# Patient Record
Sex: FEMALE | Race: WHITE
[De-identification: names, ages, dates, MRNs, and addresses within clinical notes are randomized per-mention and may not be internally consistent; named-entity substitution may affect disease eponyms.]

---

## 2022-06-01 ENCOUNTER — HOSPITAL ENCOUNTER (EMERGENCY)
Dept: HOSPITAL 79 - ER1 | Age: 57
LOS: 1 days | Discharge: LEFT BEFORE DISCHARGE | End: 2022-06-02
Payer: COMMERCIAL

## 2022-06-01 DIAGNOSIS — M79.89: Primary | ICD-10-CM

## 2022-06-01 DIAGNOSIS — E66.9: ICD-10-CM

## 2022-06-01 DIAGNOSIS — N18.9: ICD-10-CM

## 2022-06-01 LAB
BUN/CREATININE RATIO: 16 (ref 0–10)
HGB BLD-MCNC: 9.2 GM/DL (ref 12.3–15.3)
RED BLOOD COUNT: 3.49 M/UL (ref 4–5.1)
WHITE BLOOD COUNT: 7.5 K/UL (ref 4.5–11)

## 2022-06-03 ENCOUNTER — HOSPITAL ENCOUNTER (EMERGENCY)
Dept: HOSPITAL 79 - ER1 | Age: 57
Discharge: ANOTHER HOSPITAL | End: 2022-06-03
Payer: COMMERCIAL

## 2022-06-03 DIAGNOSIS — I13.0: ICD-10-CM

## 2022-06-03 DIAGNOSIS — I16.1: ICD-10-CM

## 2022-06-03 DIAGNOSIS — N18.9: ICD-10-CM

## 2022-06-03 DIAGNOSIS — Z20.822: ICD-10-CM

## 2022-06-03 DIAGNOSIS — I61.4: Primary | ICD-10-CM

## 2022-06-03 DIAGNOSIS — I50.9: ICD-10-CM

## 2022-06-03 DIAGNOSIS — D64.9: ICD-10-CM

## 2022-06-03 LAB
BUN/CREATININE RATIO: 16 (ref 0–10)
HGB BLD-MCNC: 9.5 GM/DL (ref 12.3–15.3)
RED BLOOD COUNT: 3.61 M/UL (ref 4–5.1)
WHITE BLOOD COUNT: 7.9 K/UL (ref 4.5–11)

## 2022-12-05 ENCOUNTER — OFFICE VISIT (OUTPATIENT)
Dept: FAMILY MEDICINE CLINIC | Facility: CLINIC | Age: 57
End: 2022-12-05

## 2022-12-05 VITALS
BODY MASS INDEX: 34.73 KG/M2 | HEART RATE: 84 BPM | TEMPERATURE: 98.2 F | SYSTOLIC BLOOD PRESSURE: 121 MMHG | RESPIRATION RATE: 18 BRPM | HEIGHT: 63 IN | OXYGEN SATURATION: 95 % | DIASTOLIC BLOOD PRESSURE: 81 MMHG | WEIGHT: 196 LBS

## 2022-12-05 DIAGNOSIS — I10 ESSENTIAL HYPERTENSION: ICD-10-CM
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
## 2022-12-05 DIAGNOSIS — N18.6 ESRD (END STAGE RENAL DISEASE): ICD-10-CM

## 2022-12-05 DIAGNOSIS — S12.101A CLOSED NONDISPLACED FRACTURE OF SECOND CERVICAL VERTEBRA, UNSPECIFIED FRACTURE MORPHOLOGY, INITIAL ENCOUNTER: Primary | ICD-10-CM

## 2022-12-05 DIAGNOSIS — E66.01 MORBID OBESITY WITH BODY MASS INDEX (BMI) OF 40.0 OR HIGHER: ICD-10-CM

## 2022-12-05 PROBLEM — N18.9 CKD (CHRONIC KIDNEY DISEASE): Status: ACTIVE | Noted: 2022-06-04
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
## 2022-12-05 PROBLEM — E87.70 HYPERVOLEMIA ASSOCIATED WITH RENAL INSUFFICIENCY: Status: ACTIVE | Noted: 2022-10-24

## 2022-12-05 PROBLEM — F32.A ANXIETY AND DEPRESSION: Status: ACTIVE | Noted: 2022-06-21

## 2022-12-05 PROBLEM — R68.89 TOTAL SELF-CARE DEFICIT: Status: ACTIVE | Noted: 2022-12-05

## 2022-12-05 PROBLEM — E83.41 HYPERMAGNESEMIA: Status: ACTIVE | Noted: 2022-06-14

## 2022-12-05 PROBLEM — F41.9 ANXIETY AND DEPRESSION: Status: ACTIVE | Noted: 2022-06-21

## 2022-12-05 PROBLEM — R53.81 PHYSICAL DEBILITY: Status: ACTIVE | Noted: 2022-10-24

## 2022-12-05 PROBLEM — D64.9 NORMOCYTIC ANEMIA: Status: ACTIVE | Noted: 2022-06-04

## 2022-12-05 PROBLEM — E83.51 HYPOCALCEMIA: Status: ACTIVE | Noted: 2022-10-18

## 2022-12-05 PROBLEM — R18.8 ASCITES: Status: ACTIVE | Noted: 2022-10-18

## 2022-12-05 PROBLEM — Z74.09 IMPAIRED MOBILITY: Status: ACTIVE | Noted: 2022-06-07

## 2022-12-05 PROBLEM — Z91.89 AT RISK FOR VENOUS THROMBOEMBOLISM (VTE): Status: ACTIVE | Noted: 2022-07-06

## 2022-12-05 PROBLEM — R47.01 APHASIA: Status: ACTIVE | Noted: 2022-12-05

## 2022-12-05 PROBLEM — K86.89 PANCREATIC DUCT DILATED: Status: ACTIVE | Noted: 2022-10-18

## 2022-12-05 PROBLEM — E83.39 HYPERPHOSPHATEMIA: Status: ACTIVE | Noted: 2022-06-09

## 2022-12-05 PROBLEM — E87.5 HYPERKALEMIA: Status: ACTIVE | Noted: 2022-06-09

## 2022-12-05 PROBLEM — I63.9 ACUTE ISCHEMIC STROKE: Status: ACTIVE | Noted: 2022-06-08

## 2022-12-05 PROBLEM — R41.89 COGNITIVE IMPAIRMENT: Status: ACTIVE | Noted: 2022-07-02

## 2022-12-05 PROBLEM — J90 PLEURAL EFFUSION, LEFT: Status: ACTIVE | Noted: 2022-10-18

## 2022-12-05 PROBLEM — G47.33 OSA (OBSTRUCTIVE SLEEP APNEA): Status: ACTIVE | Noted: 2022-06-06

## 2022-12-05 PROBLEM — N28.9 HYPERVOLEMIA ASSOCIATED WITH RENAL INSUFFICIENCY: Status: ACTIVE | Noted: 2022-10-24

## 2022-12-05 PROCEDURE — 99495 TRANSJ CARE MGMT MOD F2F 14D: CPT | Performed by: FAMILY MEDICINE

## 2022-12-05 RX ORDER — NIFEDIPINE 90 MG/1
90 TABLET, FILM COATED, EXTENDED RELEASE ORAL DAILY
COMMUNITY
Start: 2022-10-01

## 2022-12-05 RX ORDER — SEVELAMER CARBONATE 800 MG/1
800 TABLET, FILM COATED ORAL
COMMUNITY
Start: 2022-11-19

## 2022-12-05 RX ORDER — SENNA PLUS 8.6 MG/1
8.6 TABLET ORAL
COMMUNITY
Start: 2022-12-01

## 2022-12-05 RX ORDER — ATORVASTATIN CALCIUM 40 MG/1
40 TABLET, FILM COATED ORAL
COMMUNITY
Start: 2022-07-06

## 2022-12-05 RX ORDER — LANOLIN ALCOHOL/MO/W.PET/CERES
3 CREAM (GRAM) TOPICAL
COMMUNITY
Start: 2022-11-19

## 2022-12-05 RX ORDER — ERGOCALCIFEROL 1.25 MG/1
50000 CAPSULE ORAL
COMMUNITY
Start: 2022-12-01

## 2022-12-05 RX ORDER — CARVEDILOL 25 MG/1
25 TABLET ORAL
COMMUNITY
Start: 2022-11-19

## 2022-12-05 RX ORDER — BISACODYL 5 MG/1
5 TABLET, DELAYED RELEASE ORAL
COMMUNITY
Start: 2022-12-02 | End: 2023-01-01

## 2022-12-05 RX ORDER — BUMETANIDE 2 MG/1
4 TABLET ORAL
COMMUNITY
Start: 2022-11-19

## 2022-12-05 RX ORDER — ANORECTAL OINTMENT 15.7; .44; 24; 20.6 G/100G; G/100G; G/100G; G/100G
OINTMENT TOPICAL
COMMUNITY
Start: 2022-12-01

## 2022-12-05 RX ORDER — FAMOTIDINE 10 MG
10 TABLET ORAL
COMMUNITY
Start: 2022-11-19

## 2022-12-05 RX ORDER — LANOLIN ALCOHOL/MO/W.PET/CERES
325 CREAM (GRAM) TOPICAL
COMMUNITY
Start: 2022-10-05
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
## 2022-12-16 ENCOUNTER — OFFICE VISIT (OUTPATIENT)
Dept: FAMILY MEDICINE CLINIC | Facility: CLINIC | Age: 57
End: 2022-12-16

## 2022-12-16 VITALS
OXYGEN SATURATION: 94 % | RESPIRATION RATE: 18 BRPM | TEMPERATURE: 98.2 F | DIASTOLIC BLOOD PRESSURE: 60 MMHG | HEART RATE: 66 BPM | SYSTOLIC BLOOD PRESSURE: 132 MMHG | WEIGHT: 196 LBS | BODY MASS INDEX: 34.73 KG/M2 | HEIGHT: 63 IN

## 2022-12-16 DIAGNOSIS — R52 BODY ACHES: Primary | ICD-10-CM

## 2022-12-16 DIAGNOSIS — R05.1 ACUTE COUGH: ICD-10-CM

## 2022-12-16 DIAGNOSIS — J20.8 ACUTE BRONCHITIS DUE TO OTHER SPECIFIED ORGANISMS: ICD-10-CM

## 2022-12-16 LAB
EXPIRATION DATE: NORMAL
FLUAV AG UPPER RESP QL IA.RAPID: NOT DETECTED
FLUBV AG UPPER RESP QL IA.RAPID: NOT DETECTED
INTERNAL CONTROL: NORMAL
Lab: NORMAL
SARS-COV-2 AG UPPER RESP QL IA.RAPID: NOT DETECTED

## 2022-12-16 PROCEDURE — 96372 THER/PROPH/DIAG INJ SC/IM: CPT | Performed by: PSYCHOLOGIST

## 2022-12-16 PROCEDURE — 87428 SARSCOV & INF VIR A&B AG IA: CPT | Performed by: PSYCHOLOGIST

## 2022-12-16 PROCEDURE — 99213 OFFICE O/P EST LOW 20 MIN: CPT | Performed by: PSYCHOLOGIST

## 2022-12-16 RX ORDER — AZITHROMYCIN 250 MG/1
TABLET, FILM COATED ORAL
Qty: 6 TABLET | Refills: 1 | Status: SHIPPED | OUTPATIENT
Start: 2022-12-16 | End: 2022-12-29 | Stop reason: SDUPTHER

## 2022-12-16 RX ORDER — BENZONATATE 100 MG/1
100 CAPSULE ORAL 3 TIMES DAILY PRN
Qty: 30 CAPSULE | Refills: 0 | Status: SHIPPED | OUTPATIENT
Start: 2022-12-16 | End: 2022-12-26

## 2022-12-16 RX ORDER — CEFTRIAXONE 500 MG/1
500 INJECTION, POWDER, FOR SOLUTION INTRAMUSCULAR; INTRAVENOUS ONCE
Status: DISCONTINUED | OUTPATIENT
Start: 2022-12-16 | End: 2022-12-16

## 2022-12-16 RX ORDER — PREDNISONE 20 MG/1
20 TABLET ORAL
Qty: 10 TABLET | Refills: 0 | Status: SHIPPED | OUTPATIENT
Start: 2022-12-16 | End: 2022-12-21

## 2022-12-16 RX ORDER — ALBUTEROL SULFATE 90 UG/1
2 AEROSOL, METERED RESPIRATORY (INHALATION) 2 TIMES DAILY
Qty: 18 G | Refills: 0 | Status: SHIPPED | OUTPATIENT
Start: 2022-12-16 | End: 2022-12-23

## 2022-12-16 RX ORDER — DEXAMETHASONE SODIUM PHOSPHATE 4 MG/ML
8 INJECTION, SOLUTION INTRA-ARTICULAR; INTRALESIONAL; INTRAMUSCULAR; INTRAVENOUS; SOFT TISSUE ONCE
Status: COMPLETED | OUTPATIENT
Start: 2022-12-16 | End: 2022-12-16

## 2022-12-16 RX ORDER — CEFTRIAXONE 500 MG/1
500 INJECTION, POWDER, FOR SOLUTION INTRAMUSCULAR; INTRAVENOUS EVERY 24 HOURS
Status: COMPLETED | OUTPATIENT
Start: 2022-12-16 | End: 2022-12-16

## 2022-12-16 RX ADMIN — CEFTRIAXONE 500 MG: 500 INJECTION, POWDER, FOR SOLUTION INTRAMUSCULAR; INTRAVENOUS at 16:03

## 2022-12-16 RX ADMIN — DEXAMETHASONE SODIUM PHOSPHATE 8 MG: 4 INJECTION, SOLUTION INTRA-ARTICULAR; INTRALESIONAL; INTRAMUSCULAR; INTRAVENOUS; SOFT TISSUE at 16:04

## 2022-12-16 NOTE — PROGRESS NOTES
"Chief Complaint  Cough (Productive:/X 3 days./) and Generalized Body Aches    Subjective        Keysha Lund presents to [facility] c/o an acute visit, Dr Bond as her PCP:  Cough  This is a new problem. The current episode started in the past 7 days. The problem has been waxing and waning. The problem occurs every few minutes. The cough is productive of sputum. Associated symptoms include nasal congestion and rhinorrhea. Pertinent negatives include no fever or shortness of breath. She has tried nothing for the symptoms.   URI   This is a new problem. The current episode started in the past 7 days. The problem has been waxing and waning. There has been no fever. Associated symptoms include coughing, rhinorrhea and sneezing. She has tried acetaminophen for the symptoms. The treatment provided mild relief.       Objective   Vital Signs:  /60   Pulse 66   Temp 98.2 °F (36.8 °C) (Temporal)   Resp 18   Ht 160 cm (63\")   Wt 88.9 kg (196 lb)   SpO2 94%   BMI 34.72 kg/m²   Estimated body mass index is 34.72 kg/m² as calculated from the following:    Height as of this encounter: 160 cm (63\").    Weight as of this encounter: 88.9 kg (196 lb).    BMI is >= 30 and <35. (Class 1 Obesity). The following options were offered after discussion;: exercise counseling/recommendations and nutrition counseling/recommendations      Physical Exam  Vitals and nursing note reviewed.   Constitutional:       Appearance: Normal appearance. She is obese.   HENT:      Head: Normocephalic.      Right Ear: Tympanic membrane normal.      Left Ear: Tympanic membrane normal.      Nose: Nose normal.      Mouth/Throat:      Mouth: Mucous membranes are moist.   Eyes:      Extraocular Movements: Extraocular movements intact.      Pupils: Pupils are equal, round, and reactive to light.   Cardiovascular:      Rate and Rhythm: Normal rate and regular rhythm.      Pulses: Normal pulses.      Heart sounds: " Normal heart sounds.   Pulmonary:      Effort: Pulmonary effort is normal.      Breath sounds: Normal breath sounds.   Abdominal:      General: Abdomen is protuberant. Bowel sounds are normal.      Palpations: Abdomen is soft.   Musculoskeletal:         General: Normal range of motion.      Cervical back: Normal range of motion and neck supple.   Skin:     General: Skin is warm.      Capillary Refill: Capillary refill takes less than 2 seconds.   Neurological:      General: No focal deficit present.      Mental Status: She is alert and oriented to person, place, and time.   Psychiatric:         Mood and Affect: Mood normal.        Result Review :  The following data was reviewed by: El Morgan MD on 12/16/2022:  Common labs    Common Labs 11/8/22 11/10/22 11/17/22   WBC 5.70 5.22 4.93   Hemoglobin 7.6 (A) 7.4 (A) 7.9 (A)   Hematocrit 24.0 (A) 23.2 (A) 24.4 (A)   Platelets 199 181 244   (A) Abnormal value            LABS:  COVID & FLU A&B    Assessment and Plan   Diagnoses and all orders for this visit:    1. Body aches (Primary)  -     POCT SARS-CoV-2 Antigen SHARMIN + Flu    2. Acute cough    3. Acute bronchitis due to other specified organisms  -     dexamethasone (DECADRON) injection 8 mg  -     cefTRIAXone (ROCEPHIN) injection 500 mg    Other orders  -     benzonatate (Tessalon Perles) 100 MG capsule; Take 1 capsule by mouth 3 (Three) Times a Day As Needed for Cough for up to 10 days.  Dispense: 30 capsule; Refill: 0  -     azithromycin (Zithromax) 250 MG tablet; START TOMORROW 12/17/22.  Take 2 tablets the first day, then 1 tablet daily for 4 days.  Dispense: 6 tablet; Refill: 1  -     predniSONE (DELTASONE) 20 MG tablet; Take 1 tablet by mouth Daily With Breakfast & Dinner for 5 days. START TOMORROW 12/17/22  Dispense: 10 tablet; Refill: 0  -     albuterol sulfate  (90 Base) MCG/ACT inhaler; Inhale 2 puffs 2 (Two) Times a Day for 7 days.  Dispense: 18 g; Refill: 0           I spent 20 minutes  caring for Keysha on this date of service. This time includes time spent by me in the following activities:reviewing tests, performing a medically appropriate examination and/or evaluation , counseling and educating the patient/family/caregiver, ordering medications, tests, or procedures and documenting information in the medical record     Follow Up   No follow-ups on file.  Patient was given instructions and counseling regarding her condition or for health maintenance advice. Please see specific information pulled into the AVS if appropriate.           This document has been electronically signed by El Morgan MD  December 16, 2022 15:44 EST

## 2022-12-27 ENCOUNTER — CLINICAL SUPPORT (OUTPATIENT)
Dept: FAMILY MEDICINE CLINIC | Facility: CLINIC | Age: 57
End: 2022-12-27
Payer: COMMERCIAL

## 2022-12-27 VITALS — BODY MASS INDEX: 31.01 KG/M2 | HEIGHT: 63 IN | TEMPERATURE: 97.4 F | WEIGHT: 175 LBS

## 2022-12-27 DIAGNOSIS — R09.81 NASAL CONGESTION: Primary | ICD-10-CM

## 2022-12-27 PROCEDURE — 96372 THER/PROPH/DIAG INJ SC/IM: CPT | Performed by: PSYCHOLOGIST

## 2022-12-27 RX ORDER — DEXAMETHASONE SODIUM PHOSPHATE 4 MG/ML
8 INJECTION, SOLUTION INTRA-ARTICULAR; INTRALESIONAL; INTRAMUSCULAR; INTRAVENOUS; SOFT TISSUE ONCE
Status: COMPLETED | OUTPATIENT
Start: 2022-12-27 | End: 2022-12-27

## 2022-12-27 RX ADMIN — DEXAMETHASONE SODIUM PHOSPHATE 8 MG: 4 INJECTION, SOLUTION INTRA-ARTICULAR; INTRALESIONAL; INTRAMUSCULAR; INTRAVENOUS; SOFT TISSUE at 11:43

## 2022-12-27 NOTE — PROGRESS NOTES
Keysha Lund presents to [facility] primary care for injection of ________decadron 8 mg_______.. Patient currently takes   Current Outpatient Medications:   •  Acetaminophen 325 MG capsule, 650 mg., Disp: , Rfl:   •  ASPIRIN 81 PO, Take 1 tablet by mouth Daily., Disp: , Rfl:   •  atorvastatin (LIPITOR) 40 MG tablet, 40 mg., Disp: , Rfl:   •  azithromycin (Zithromax) 250 MG tablet, START TOMORROW 12/17/22.  Take 2 tablets the first day, then 1 tablet daily for 4 days., Disp: 6 tablet, Rfl: 1  •  bisacodyl (DULCOLAX) 5 MG EC tablet, 5 mg., Disp: , Rfl:   •  bumetanide (BUMEX) 2 MG tablet, 4 mg., Disp: , Rfl:   •  carvedilol (COREG) 25 MG tablet, 25 mg., Disp: , Rfl:   •  ergocalciferol (ERGOCALCIFEROL) 1.25 MG (52135 UT) capsule, 50,000 Int'l Units., Disp: , Rfl:   •  famotidine (PEPCID) 10 MG tablet, 10 mg., Disp: , Rfl:   •  ferrous sulfate 325 (65 FE) MG EC tablet, 325 mg., Disp: , Rfl:   •  melatonin 3 MG tablet, 3 mg., Disp: , Rfl:   •  Menthol-Zinc Oxide (Calmoseptine) 0.44-20.6 % ointment,  1 aquiles, Ointment, Topical BID, 1 EA, Refill(s) 0, Route to Pharmacy Electronically, [facility], 160, 11/28/22 5:06:00 EST, Height/Length Dosing, cm, 89.9, 11/28/22 5:06:00 EST, Weight Dosing, kg, Disp: , Rfl:   •  NIFEdipine CC (ADALAT CC) 90 MG 24 hr tablet, Take 90 mg by mouth Daily. on an empty stomach, Disp: , Rfl:   •  senna (SENOKOT) 8.6 MG tablet, 8.6 mg., Disp: , Rfl:   •  sevelamer (RENVELA) 800 MG tablet, 800 mg., Disp: , Rfl:   No current facility-administered medications for this visit. and has vitals today of   Vitals:    12/27/22 1139   Temp: 97.4 °F (36.3 °C)    There were no vitals filed for this visit.     Patient was told to return to clinic by Provider, No Known and to receive injection, Any allergies and the injection to be given was reviewed with the patient. Patient verbalized understanding and are aware of the plan.     Patient tolerated injection well with no signs or  symptoms of allergic reaction or intolerance, Patient was monitored for 15 minutes and released after the appropriate time.    Or   Patient tolerated injection well with no immediate signs or symptoms of allergic reaction or intolerance, Patient declined to wait 15 minute injection time for monitoring of signs or symptoms of allergic reaction or intolerance,  Shital Tejada LPN

## 2022-12-29 ENCOUNTER — OFFICE VISIT (OUTPATIENT)
Dept: FAMILY MEDICINE CLINIC | Facility: CLINIC | Age: 57
End: 2022-12-29

## 2022-12-29 VITALS
SYSTOLIC BLOOD PRESSURE: 121 MMHG | HEART RATE: 85 BPM | OXYGEN SATURATION: 99 % | RESPIRATION RATE: 18 BRPM | TEMPERATURE: 98.9 F | BODY MASS INDEX: 31.01 KG/M2 | WEIGHT: 175 LBS | DIASTOLIC BLOOD PRESSURE: 68 MMHG | HEIGHT: 63 IN

## 2022-12-29 DIAGNOSIS — R05.1 ACUTE COUGH: ICD-10-CM

## 2022-12-29 DIAGNOSIS — R09.81 NASAL CONGESTION: ICD-10-CM

## 2022-12-29 DIAGNOSIS — Z20.822 EXPOSURE TO COVID-19 VIRUS: Primary | ICD-10-CM

## 2022-12-29 DIAGNOSIS — U07.1 COVID-19 VIRUS INFECTION: ICD-10-CM

## 2022-12-29 LAB
EXPIRATION DATE: ABNORMAL
FLUAV AG UPPER RESP QL IA.RAPID: NOT DETECTED
FLUBV AG UPPER RESP QL IA.RAPID: NOT DETECTED
INTERNAL CONTROL: ABNORMAL
Lab: ABNORMAL
SARS-COV-2 AG UPPER RESP QL IA.RAPID: DETECTED

## 2022-12-29 PROCEDURE — 87428 SARSCOV & INF VIR A&B AG IA: CPT | Performed by: PSYCHOLOGIST

## 2022-12-29 PROCEDURE — 99213 OFFICE O/P EST LOW 20 MIN: CPT | Performed by: PSYCHOLOGIST

## 2022-12-29 PROCEDURE — 96372 THER/PROPH/DIAG INJ SC/IM: CPT | Performed by: PSYCHOLOGIST

## 2022-12-29 RX ORDER — PREDNISONE 20 MG/1
20 TABLET ORAL
Qty: 10 TABLET | Refills: 0 | Status: SHIPPED | OUTPATIENT
Start: 2022-12-29 | End: 2023-01-03

## 2022-12-29 RX ORDER — DEXAMETHASONE SODIUM PHOSPHATE 4 MG/ML
8 INJECTION, SOLUTION INTRA-ARTICULAR; INTRALESIONAL; INTRAMUSCULAR; INTRAVENOUS; SOFT TISSUE ONCE
Status: COMPLETED | OUTPATIENT
Start: 2022-12-29 | End: 2022-12-29

## 2022-12-29 RX ORDER — B COMPLEX, C NO.20/FOLIC ACID 1 MG
1 CAPSULE ORAL DAILY
COMMUNITY
Start: 2022-12-27

## 2022-12-29 RX ORDER — CEFTRIAXONE 500 MG/1
500 INJECTION, POWDER, FOR SOLUTION INTRAMUSCULAR; INTRAVENOUS ONCE
Status: COMPLETED | OUTPATIENT
Start: 2022-12-29 | End: 2022-12-29

## 2022-12-29 RX ORDER — AZITHROMYCIN 250 MG/1
TABLET, FILM COATED ORAL
Qty: 6 TABLET | Refills: 1 | Status: SHIPPED | OUTPATIENT
Start: 2022-12-29 | End: 2023-01-04 | Stop reason: SDUPTHER

## 2022-12-29 RX ORDER — GUAIFENESIN AND CODEINE PHOSPHATE 100; 10 MG/5ML; MG/5ML
5 SOLUTION ORAL 3 TIMES DAILY PRN
Qty: 150 ML | Refills: 0 | Status: SHIPPED | OUTPATIENT
Start: 2022-12-29 | End: 2023-01-04 | Stop reason: SDUPTHER

## 2022-12-29 RX ADMIN — CEFTRIAXONE 500 MG: 500 INJECTION, POWDER, FOR SOLUTION INTRAMUSCULAR; INTRAVENOUS at 13:53

## 2022-12-29 RX ADMIN — DEXAMETHASONE SODIUM PHOSPHATE 8 MG: 4 INJECTION, SOLUTION INTRA-ARTICULAR; INTRALESIONAL; INTRAMUSCULAR; INTRAVENOUS; SOFT TISSUE at 13:53

## 2022-12-29 NOTE — PROGRESS NOTES
"Chief Complaint  Exposure To Known Illness (Exposure to COVID requesting a test - Patient states she has cough./)    Patient is here for an urgent care/acute visit.  Patient has an established, non-[facility] Primary Care Provider.       Juan C Lund presents to [facility]IMARY [facility]ARE c/o an acute medical care visit.  Encouraged to establish care here at our   Clinic. She is WC bound x a few months feels weak. She goes to dialysis 3x/week.    Cough  This is a new problem. The current episode started 1 to 4 weeks ago. The problem has been waxing and waning. The problem occurs every few minutes. The cough is productive of sputum. Associated symptoms include chest pain, nasal congestion and rhinorrhea. Pertinent negatives include no fever, headaches or shortness of breath. She has tried rest for the symptoms. The treatment provided no relief. There is no history of asthma or COPD.   URI   This is a new problem. The current episode started 1 to 4 weeks ago. The problem has been waxing and waning. There has been no fever. Associated symptoms include chest pain, coughing and rhinorrhea. Pertinent negatives include no headaches. She has tried nothing for the symptoms.       Objective   Vital Signs:  /68 (BP Location: Right arm, Patient Position: Sitting, Cuff Size: Adult)   Pulse 85   Temp 98.9 °F (37.2 °C) (Temporal)   Resp 18   Ht 160 cm (63\")   Wt 79.4 kg (175 lb)   SpO2 99%   BMI 31.00 kg/m²     BMI:   BMI is >= 30 and <35. (Class 1 Obesity). The following options were offered after discussion;: exercise counseling/recommendations and nutrition counseling/recommendations      Physical Exam  Vitals and nursing note reviewed.   Constitutional:       Appearance: Normal appearance. She is obese.   HENT:      Head: Normocephalic.      Right Ear: Tympanic membrane normal.      Left Ear: Tympanic membrane normal.      Nose: Nose normal.      Mouth/Throat:      Mouth: Mucous " membranes are moist.   Eyes:      Extraocular Movements: Extraocular movements intact.      Pupils: Pupils are equal, round, and reactive to light.   Cardiovascular:      Rate and Rhythm: Normal rate and regular rhythm.      Pulses: Normal pulses.      Heart sounds: Normal heart sounds.   Pulmonary:      Effort: Pulmonary effort is normal.      Breath sounds: Normal breath sounds.   Abdominal:      General: Abdomen is protuberant. Bowel sounds are normal.      Palpations: Abdomen is soft.   Musculoskeletal:         General: Normal range of motion.      Cervical back: Normal range of motion and neck supple.   Skin:     General: Skin is warm.      Capillary Refill: Capillary refill takes less than 2 seconds.   Neurological:      General: No focal deficit present.      Mental Status: She is alert and oriented to person, place, and time.   Psychiatric:         Mood and Affect: Mood normal.          Result Review :   The following data was reviewed by: El Morgan MD on 12/29/2022:  LABS: COVID POS / FLU A & B NEG 12/29/22       Covid Tests    Common Labsle 10/18/22   COVID19 Not Detected           Data reviewed: Radiologic studies 12/23/22 C SPINE XRAY          Assessment and Plan    Diagnoses and all orders for this visit:    1. Exposure to COVID-19 virus (Primary)  -     POCT SARS-CoV-2 Antigen SHARMIN + Flu    2. COVID-19 virus infection    3. Acute cough    4. Nasal congestion             Follow Up   Return if symptoms worsen or fail to improve/ER/RTC.    Patient was given instructions and counseling regarding her condition or for health maintenance advice. Please see specific information pulled into the AVS if appropriate.

## 2023-01-04 ENCOUNTER — OFFICE VISIT (OUTPATIENT)
Dept: FAMILY MEDICINE CLINIC | Facility: CLINIC | Age: 58
End: 2023-01-04
Payer: COMMERCIAL

## 2023-01-04 VITALS
HEART RATE: 63 BPM | SYSTOLIC BLOOD PRESSURE: 160 MMHG | TEMPERATURE: 98.2 F | HEIGHT: 63 IN | WEIGHT: 175 LBS | DIASTOLIC BLOOD PRESSURE: 72 MMHG | OXYGEN SATURATION: 97 % | BODY MASS INDEX: 31.01 KG/M2 | RESPIRATION RATE: 18 BRPM

## 2023-01-04 DIAGNOSIS — R05.1 ACUTE COUGH: ICD-10-CM

## 2023-01-04 DIAGNOSIS — R05.3 CHRONIC COUGH: Primary | ICD-10-CM

## 2023-01-04 PROCEDURE — 99213 OFFICE O/P EST LOW 20 MIN: CPT | Performed by: PSYCHOLOGIST

## 2023-01-04 PROCEDURE — 96372 THER/PROPH/DIAG INJ SC/IM: CPT | Performed by: PSYCHOLOGIST

## 2023-01-04 RX ORDER — AZITHROMYCIN 250 MG/1
TABLET, FILM COATED ORAL
Qty: 6 TABLET | Refills: 1 | Status: SHIPPED | OUTPATIENT
Start: 2023-01-04 | End: 2023-01-09

## 2023-01-04 RX ORDER — ALBUTEROL SULFATE 90 UG/1
2 AEROSOL, METERED RESPIRATORY (INHALATION) 3 TIMES DAILY
Qty: 18 G | Refills: 0 | Status: SHIPPED | OUTPATIENT
Start: 2023-01-04 | End: 2023-01-11

## 2023-01-04 RX ORDER — GUAIFENESIN AND CODEINE PHOSPHATE 100; 10 MG/5ML; MG/5ML
5 SOLUTION ORAL 3 TIMES DAILY PRN
Qty: 150 ML | Refills: 0 | Status: SHIPPED | OUTPATIENT
Start: 2023-01-04 | End: 2023-01-14

## 2023-01-04 RX ORDER — DEXAMETHASONE SODIUM PHOSPHATE 4 MG/ML
8 INJECTION, SOLUTION INTRA-ARTICULAR; INTRALESIONAL; INTRAMUSCULAR; INTRAVENOUS; SOFT TISSUE ONCE
Status: COMPLETED | OUTPATIENT
Start: 2023-01-04 | End: 2023-01-04

## 2023-01-04 RX ADMIN — DEXAMETHASONE SODIUM PHOSPHATE 8 MG: 4 INJECTION, SOLUTION INTRA-ARTICULAR; INTRALESIONAL; INTRAMUSCULAR; INTRAVENOUS; SOFT TISSUE at 11:24

## 2023-01-04 NOTE — PROGRESS NOTES
Chief Complaint  COVID 19 (Diagnosed on 12/29/2022 here was gave Rocephin 500mg and Decadron 8mg - states she's now got congestion: // tested positive still yesterday he first tested positive on 12/27/2022//)    Patient is here for an urgent care/acute visit.  Patient has an established, non-[facility] Primary Care Provider.       Juan C Lund presents to [facility]IMARY [facility]ARE c/om an acute medical care visit.     Cough  This is a recurrent problem. The current episode started 1 to 4 weeks ago. The problem has been waxing and waning. The problem occurs every few minutes. The cough is productive of sputum. Pertinent negatives include no fever, nasal congestion or shortness of breath. She has tried oral steroids for the symptoms. The treatment provided mild relief. There is no history of asthma.       Objective   Vital Signs:  /72 (BP Location: Right arm, Patient Position: Sitting, Cuff Size: Large Adult)   Pulse 63   Temp 98.2 °F (36.8 °C) (Temporal)   Resp 18   Ht 160 cm (63\")   Wt 79.4 kg (175 lb)   SpO2 97%   BMI 31.00 kg/m²     BMI:   BMI is >= 30 and <35. (Class 1 Obesity). The following options were offered after discussion;: weight loss educational material (shared in after visit summary), exercise counseling/recommendations and nutrition counseling/recommendations      Physical Exam  Vitals and nursing note reviewed.   Constitutional:       Appearance: Normal appearance. She is obese.   HENT:      Head: Normocephalic.      Right Ear: Tympanic membrane normal.      Left Ear: Tympanic membrane normal.      Nose: Nose normal.      Mouth/Throat:      Mouth: Mucous membranes are moist.   Eyes:      Extraocular Movements: Extraocular movements intact.      Pupils: Pupils are equal, round, and reactive to light.   Cardiovascular:      Rate and Rhythm: Normal rate and regular rhythm.      Pulses: Normal pulses.      Heart sounds: Normal heart sounds.    Pulmonary:      Effort: Pulmonary effort is normal.      Breath sounds: Examination of the right-upper field reveals rhonchi. Examination of the left-upper field reveals rhonchi. Rhonchi present.   Abdominal:      General: Abdomen is protuberant. Bowel sounds are normal.      Palpations: Abdomen is soft.   Musculoskeletal:         General: Normal range of motion.      Cervical back: Normal range of motion and neck supple.   Skin:     General: Skin is warm.      Capillary Refill: Capillary refill takes less than 2 seconds.   Neurological:      General: No focal deficit present.      Mental Status: She is alert and oriented to person, place, and time.   Psychiatric:         Mood and Affect: Mood normal.          Result Review :   The following data was reviewed by: El Morgan MD on 01/04/2023:  Covid Tests    Common Labsle 10/18/22   COVID19 Not Detected           Data reviewed: Radiologic studies 12/23/2022 XR [facility]INE    Chest  X-ray was performed this visit.  Indication:  cough  Interpretation/Findings: NO cardiopulmonary disease  Compared with previous X-ray.11/3/2022      Assessment and Plan    Diagnoses and all orders for this visit:    1. Chronic cough (Primary)  Comments:  s/p Covid infection   Orders:  -     dexamethasone (DECADRON) injection 8 mg  -     XR Chest PA & Lateral    2. Acute cough  -     guaiFENesin-codeine (GUAIFENESIN AC) 100-10 MG/5ML liquid; Take 5 mL by mouth 3 (Three) Times a Day As Needed for Cough for up to 10 days.  Dispense: 150 mL; Refill: 0    Other orders  -     albuterol sulfate  (90 Base) MCG/ACT inhaler; Inhale 2 puffs 3 (Three) Times a Day for 7 days.  Dispense: 18 g; Refill: 0  -     azithromycin (Zithromax) 250 MG tablet; START TOMORROW 12/17/22 DURING LUNCH.   Take 2 tablets the first day, then 1 tablet daily for 4 days.  Dispense: 6 tablet; Refill: 1      I spent 20 minutes caring for Keysha on this date of service. This time includes time spent by me in  the following activities:reviewing tests, performing a medically appropriate examination and/or evaluation , counseling and educating the patient/family/caregiver, ordering medications, tests, or procedures and documenting information in the medical record       Follow Up   Return if symptoms worsen or fail to improve/ER/ RTC.    Patient was given instructions and counseling regarding her condition or for health maintenance advice. Please see specific information pulled into the AVS if appropriate.         This document has been electronically signed by El Morgan MD  January 4, 2023 11:40 EST

## 2023-01-09 ENCOUNTER — OFFICE VISIT (OUTPATIENT)
Dept: FAMILY MEDICINE CLINIC | Facility: CLINIC | Age: 58
End: 2023-01-09
Payer: COMMERCIAL

## 2023-01-09 VITALS
HEIGHT: 63 IN | OXYGEN SATURATION: 93 % | SYSTOLIC BLOOD PRESSURE: 114 MMHG | BODY MASS INDEX: 31.01 KG/M2 | RESPIRATION RATE: 20 BRPM | WEIGHT: 175 LBS | DIASTOLIC BLOOD PRESSURE: 60 MMHG | HEART RATE: 69 BPM | TEMPERATURE: 97.8 F

## 2023-01-09 DIAGNOSIS — D64.9 NORMOCYTIC ANEMIA: ICD-10-CM

## 2023-01-09 DIAGNOSIS — J20.9 ACUTE BRONCHITIS, UNSPECIFIED ORGANISM: Primary | ICD-10-CM

## 2023-01-09 PROBLEM — I50.9 NEW ONSET OF CONGESTIVE HEART FAILURE: Status: ACTIVE | Noted: 2023-01-09

## 2023-01-09 PROBLEM — S12.110A ODONTOID FRACTURE: Status: ACTIVE | Noted: 2022-10-18

## 2023-01-09 PROBLEM — F29 PSYCHOSIS: Status: ACTIVE | Noted: 2023-01-09

## 2023-01-09 PROCEDURE — 99213 OFFICE O/P EST LOW 20 MIN: CPT | Performed by: FAMILY MEDICINE

## 2023-01-09 RX ORDER — AMOXICILLIN AND CLAVULANATE POTASSIUM 500; 125 MG/1; MG/1
TABLET, FILM COATED ORAL
COMMUNITY
Start: 2023-01-07

## 2023-01-09 NOTE — PROGRESS NOTES
Chief Complaint  Follow-up (ER  visit  1/6/2023; states lung and neck stiff, still coughing)    Juan C Lund presents to [facility] PRIMARY [facility]  History of Present Illness  The patient is a 56 yo female who is here because she was in the ER 3 days ago because of persistent cough, hypotension, SOA. The patient was Covid positive on 12/29/2022. She states that she is doing better and she still taking Augmentin and guafenesin with codeine for cough      Objective   Vital Signs:  /60 (BP Location: Right arm, Patient Position: Sitting, Cuff Size: Adult)   Pulse 69   Temp 97.8 °F (36.6 °C) (Temporal)   Resp 20   Ht 160 cm (63\")   Wt 79.4 kg (175 lb)   SpO2 93%   BMI 31.00 kg/m²   Estimated body mass index is 31 kg/m² as calculated from the following:    Height as of this encounter: 160 cm (63\").    Weight as of this encounter: 79.4 kg (175 lb).    BMI is >= 30 and <35. (Class 1 Obesity). The following options were offered after discussion;: exercise counseling/recommendations and nutrition counseling/recommendations      Physical Exam  Vitals and nursing note reviewed.   Constitutional:       Appearance: Normal appearance.   HENT:      Head: Normocephalic and atraumatic.      Right Ear: Tympanic membrane and ear canal normal.      Left Ear: Tympanic membrane and ear canal normal.      Nose: Nose normal.      Mouth/Throat:      Mouth: Mucous membranes are moist.   Eyes:      Extraocular Movements: Extraocular movements intact.      Pupils: Pupils are equal, round, and reactive to light.   Cardiovascular:      Rate and Rhythm: Normal rate and regular rhythm.  No extrasystoles are present.     Heart sounds: Normal heart sounds. No murmur heard.  Pulmonary:      Effort: Pulmonary effort is normal.      Breath sounds: Normal breath sounds. No decreased breath sounds, wheezing or rhonchi.      Comments: With bilateral coarse crackles but no wheezing.  Abdominal:       Palpations: Abdomen is soft. There is no mass.      Tenderness: There is no abdominal tenderness. There is no right CVA tenderness, left CVA tenderness, guarding or rebound.   Musculoskeletal:         General: Normal range of motion.      Cervical back: Normal range of motion and neck supple.      Right lower leg: No edema.      Left lower leg: No edema.   Skin:     Findings: No rash.   Neurological:      General: No focal deficit present.      Mental Status: She is alert and oriented to person, place, and time.      Sensory: Sensation is intact.      Motor: Motor function is intact.      Coordination: Coordination is intact.      Gait: Gait is intact.      Deep Tendon Reflexes: Reflexes are normal and symmetric.   Psychiatric:         Attention and Perception: Attention and perception normal.         Mood and Affect: Mood normal.         Speech: Speech normal.         Behavior: Behavior normal. Behavior is cooperative.         Thought Content: Thought content normal.        Result Review :                Assessment and Plan   Diagnoses and all orders for this visit:    1. Acute bronchitis, unspecified organism (Primary)    2. Normocytic anemia           I spent 20 minutes caring for Keysha on this date of service. This time includes time spent by me in the following activities:preparing for the visit, reviewing tests, performing a medically appropriate examination and/or evaluation , counseling and educating the patient/family/caregiver and documenting information in the medical record       Follow Up   Return in about 6 weeks (around 2/20/2023) for Annual physical, Blood work.  Patient was given instructions and counseling regarding her condition or for health maintenance advice. Please see specific information pulled into the AVS if appropriate.     The patient is advised to continue all of her regular medications as prescribed. She was counseled regarding the importance of diet, exercise and medication  compliance.      This document has been electronically signed by Ky Bond MD  January 9, 2023 16:49 EST

## 2023-03-09 ENCOUNTER — TELEPHONE (OUTPATIENT)
Dept: FAMILY MEDICINE CLINIC | Facility: CLINIC | Age: 58
End: 2023-03-09
Payer: COMMERCIAL

## 2023-03-21 ENCOUNTER — OFFICE VISIT (OUTPATIENT)
Dept: FAMILY MEDICINE CLINIC | Facility: CLINIC | Age: 58
End: 2023-03-21
Payer: COMMERCIAL

## 2023-03-21 VITALS
OXYGEN SATURATION: 99 % | TEMPERATURE: 97.6 F | HEART RATE: 87 BPM | RESPIRATION RATE: 18 BRPM | SYSTOLIC BLOOD PRESSURE: 112 MMHG | WEIGHT: 175 LBS | DIASTOLIC BLOOD PRESSURE: 75 MMHG | BODY MASS INDEX: 31.01 KG/M2 | HEIGHT: 63 IN

## 2023-03-21 DIAGNOSIS — J01.01 ACUTE RECURRENT MAXILLARY SINUSITIS: ICD-10-CM

## 2023-03-21 DIAGNOSIS — R09.81 NASAL SINUS CONGESTION: ICD-10-CM

## 2023-03-21 DIAGNOSIS — R53.83 OTHER FATIGUE: Primary | ICD-10-CM

## 2023-03-21 PROCEDURE — 99213 OFFICE O/P EST LOW 20 MIN: CPT | Performed by: PSYCHOLOGIST

## 2023-03-21 RX ORDER — CETIRIZINE HYDROCHLORIDE 10 MG/1
10 TABLET ORAL
Qty: 30 TABLET | Refills: 0 | Status: SHIPPED | OUTPATIENT
Start: 2023-03-21 | End: 2023-04-20

## 2023-03-21 RX ORDER — AZITHROMYCIN 250 MG/1
TABLET, FILM COATED ORAL
Qty: 6 TABLET | Refills: 1 | Status: SHIPPED | OUTPATIENT
Start: 2023-03-21

## 2023-03-21 RX ORDER — TORSEMIDE 100 MG/1
1 TABLET ORAL DAILY
COMMUNITY
Start: 2023-03-15

## 2023-03-21 RX ORDER — CYANOCOBALAMIN 1000 UG/ML
1000 INJECTION, SOLUTION INTRAMUSCULAR; SUBCUTANEOUS
Status: SHIPPED | OUTPATIENT
Start: 2023-03-21

## 2023-03-21 RX ORDER — DEXAMETHASONE SODIUM PHOSPHATE 4 MG/ML
8 INJECTION, SOLUTION INTRA-ARTICULAR; INTRALESIONAL; INTRAMUSCULAR; INTRAVENOUS; SOFT TISSUE ONCE
Status: COMPLETED | OUTPATIENT
Start: 2023-03-21 | End: 2023-03-21

## 2023-03-21 RX ADMIN — CYANOCOBALAMIN 1000 MCG: 1000 INJECTION, SOLUTION INTRAMUSCULAR; SUBCUTANEOUS at 16:00

## 2023-03-21 RX ADMIN — DEXAMETHASONE SODIUM PHOSPHATE 8 MG: 4 INJECTION, SOLUTION INTRA-ARTICULAR; INTRALESIONAL; INTRAMUSCULAR; INTRAVENOUS; SOFT TISSUE at 16:02

## 2023-03-21 NOTE — PROGRESS NOTES
"Chief Complaint  Sinus Problem (Sinus issues X 1 month - requesting decadron shot./)    Patient is here for an urgent care/acute visit.  Patient has an established, non-[facility] Primary Care Provider.       Juan C Lund presents to [facility]   C/o an acute medical visit/ denies any exposure to flu/ Covid/Strep:     Sinus Problem  This is a new problem. The current episode started more than 1 month ago. The problem has been gradually worsening since onset. There has been no fever. The pain is moderate. Associated symptoms include congestion, coughing (just a little ), headaches and sinus pressure. Pertinent negatives include no ear pain. Past treatments include nothing.   Fatigue  This is a recurrent problem. The current episode started more than 1 month ago. The problem occurs intermittently. The problem has been gradually worsening. Associated symptoms include congestion, coughing (just a little ), fatigue and headaches. She has tried nothing for the symptoms.       Objective   Vital Signs:  /75 (BP Location: Right arm, Patient Position: Sitting, Cuff Size: Adult)   Pulse 87   Temp 97.6 °F (36.4 °C) (Temporal)   Resp 18   Ht 160 cm (63\")   Wt 79.4 kg (175 lb)   SpO2 99%   BMI 31.00 kg/m²     BMI:   BMI is >= 30 and <35. (Class 1 Obesity). The following options were offered after discussion;: exercise counseling/recommendations and nutrition counseling/recommendations      Physical Exam  Vitals and nursing note reviewed.   Constitutional:       Appearance: Normal appearance. She is obese.   HENT:      Head: Normocephalic.      Right Ear: Tympanic membrane normal.      Left Ear: Tympanic membrane normal.      Nose: Nose normal.      Mouth/Throat:      Mouth: Mucous membranes are moist.   Eyes:      Extraocular Movements: Extraocular movements intact.      Pupils: Pupils are equal, round, and reactive to light.   Cardiovascular:      Rate and Rhythm: " Normal rate and regular rhythm.      Pulses: Normal pulses.      Heart sounds: Normal heart sounds.   Pulmonary:      Effort: Pulmonary effort is normal.      Breath sounds: Normal breath sounds.   Abdominal:      General: Abdomen is protuberant. Bowel sounds are normal.      Palpations: Abdomen is soft.   Musculoskeletal:         General: Normal range of motion.      Cervical back: Normal range of motion and neck supple.   Skin:     General: Skin is warm.      Capillary Refill: Capillary refill takes less than 2 seconds.   Neurological:      General: No focal deficit present.      Mental Status: She is alert and oriented to person, place, and time.   Psychiatric:         Mood and Affect: Mood normal.          Result Review :   The following data was reviewed by: El Morgan MD on 03/21/2023:  Common labs    Common Labs 11/8/22 11/10/22 11/17/22   WBC 5.70 5.22 4.93   Hemoglobin 7.6 (A) 7.4 (A) 7.9 (A)   Hematocrit 24.0 (A) 23.2 (A) 24.4 (A)   Platelets 199 181 244   (A) Abnormal value                Assessment and Plan    Diagnoses and all orders for this visit:    1. Other fatigue (Primary)  -     cyanocobalamin injection 1,000 mcg    2. Nasal sinus congestion  -     dexamethasone (DECADRON) injection 8 mg    3. Acute recurrent maxillary sinusitis  -     dexamethasone (DECADRON) injection 8 mg    Other orders  -     azithromycin (Zithromax) 250 MG tablet; Take 2 tablets the first day, then 1 tablet daily for 4 days.  Dispense: 6 tablet; Refill: 1  -     cetirizine (zyrTEC) 10 MG tablet; Take 1 tablet by mouth Daily With Breakfast for 30 days.  Dispense: 30 tablet; Refill: 0           I spent 20 minutes caring for Keysha on this date of service. This time includes time spent by me in the following activities:reviewing tests, performing a medically appropriate examination and/or evaluation , counseling and educating the patient/family/caregiver, ordering medications, tests, or procedures and documenting  information in the medical record     Follow Up   Return if symptoms worsen or fail to improve/RTC/ER.    Patient was given instructions and counseling regarding her condition or for health maintenance advice. Please see specific information pulled into the AVS if appropriate.         This document has been electronically signed by El Morgan MD  March 21, 2023 15:37 EDT

## 2023-04-05 NOTE — PROGRESS NOTES
Medication was given by Shital Tejada. Shital is not longer employed with [employer].

## 2023-04-18 ENCOUNTER — OFFICE VISIT (OUTPATIENT)
Dept: FAMILY MEDICINE CLINIC | Facility: CLINIC | Age: 58
End: 2023-04-18
Payer: COMMERCIAL

## 2023-04-18 VITALS
TEMPERATURE: 96.6 F | HEART RATE: 73 BPM | BODY MASS INDEX: 31.36 KG/M2 | RESPIRATION RATE: 18 BRPM | HEIGHT: 63 IN | WEIGHT: 177 LBS | SYSTOLIC BLOOD PRESSURE: 116 MMHG | OXYGEN SATURATION: 99 % | DIASTOLIC BLOOD PRESSURE: 72 MMHG

## 2023-04-18 DIAGNOSIS — N18.6 ESRD (END STAGE RENAL DISEASE): ICD-10-CM

## 2023-04-18 DIAGNOSIS — R41.89 COGNITIVE IMPAIRMENT: ICD-10-CM

## 2023-04-18 DIAGNOSIS — Z12.31 ENCOUNTER FOR SCREENING MAMMOGRAM FOR BREAST CANCER: ICD-10-CM

## 2023-04-18 DIAGNOSIS — Z23 ENCOUNTER FOR IMMUNIZATION: ICD-10-CM

## 2023-04-18 DIAGNOSIS — E66.9 CLASS 1 OBESITY WITH SERIOUS COMORBIDITY AND BODY MASS INDEX (BMI) OF 31.0 TO 31.9 IN ADULT, UNSPECIFIED OBESITY TYPE: ICD-10-CM

## 2023-04-18 DIAGNOSIS — Z76.89 ENCOUNTER TO ESTABLISH CARE: Primary | ICD-10-CM

## 2023-04-18 DIAGNOSIS — Z12.11 ENCOUNTER FOR SCREENING COLONOSCOPY: ICD-10-CM

## 2023-04-18 DIAGNOSIS — F28 OTHER PSYCHOTIC DISORDER NOT DUE TO SUBSTANCE OR KNOWN PHYSIOLOGICAL CONDITION: ICD-10-CM

## 2023-04-18 DIAGNOSIS — E55.9 VITAMIN D DEFICIENCY: ICD-10-CM

## 2023-04-18 DIAGNOSIS — K21.9 GASTROESOPHAGEAL REFLUX DISEASE WITHOUT ESOPHAGITIS: ICD-10-CM

## 2023-04-18 DIAGNOSIS — D64.9 NORMOCYTIC ANEMIA: ICD-10-CM

## 2023-04-18 DIAGNOSIS — I10 ESSENTIAL HYPERTENSION: ICD-10-CM

## 2023-04-18 RX ORDER — NIFEDIPINE 90 MG/1
90 TABLET, FILM COATED, EXTENDED RELEASE ORAL DAILY
Qty: 90 TABLET | Refills: 0 | Status: SHIPPED | OUTPATIENT
Start: 2023-04-18 | End: 2023-07-17

## 2023-04-18 RX ADMIN — CYANOCOBALAMIN 1000 MCG: 1000 INJECTION, SOLUTION INTRAMUSCULAR; SUBCUTANEOUS at 13:36

## 2023-04-18 NOTE — PROGRESS NOTES
"Chief Complaint  Med Refill    Subjective        Keysha Lund presents to [facility] PRIMARY CARE  History of Present Illness  The patient is a 58 yo female who is here to get established as a primary care patient. The patient has hypertension, ESRD, obesity, anemia, psychosis, anemia, GERD and Vitamin D deficiency. The patient's  is in the room with the patient to basically answer most of my questions because of the patient's impairment. The patient is not really seeing a specialist at this time and they cannot really remember the last time she had a complete preventive labs.      Objective   Vital Signs:  /72 (BP Location: Right arm, Patient Position: Sitting, Cuff Size: Adult)   Pulse 73   Temp 96.6 °F (35.9 °C) (Temporal)   Resp 18   Ht 160 cm (63\")   Wt 80.3 kg (177 lb)   SpO2 99%   BMI 31.35 kg/m²   Estimated body mass index is 31.35 kg/m² as calculated from the following:    Height as of this encounter: 160 cm (63\").    Weight as of this encounter: 80.3 kg (177 lb).       BMI is >= 30 and <35. (Class 1 Obesity). The following options were offered after discussion;: exercise counseling/recommendations and nutrition counseling/recommendations      Physical Exam   Result Review :                   Assessment and Plan   Diagnoses and all orders for this visit:    1. Encounter to establish care (Primary)    2. Essential hypertension  Assessment & Plan:  Hypertension is stable.  Continue current treatment regimen.  Dietary sodium restriction.  Weight loss.  Continue current medications.  Blood pressure will be reassessed in 1 month.    Orders:  -     NIFEdipine CC (ADALAT CC) 90 MG 24 hr tablet; Take 1 tablet by mouth Daily for 90 days.  Dispense: 90 tablet; Refill: 0    3. ESRD (end stage renal disease)  Assessment & Plan:  Renal condition is stable.  Continue current treatment regimen.  Continue current medications.  Renal condition will be reassessed in 1 month.      4. " Class 1 obesity with serious comorbidity and body mass index (BMI) of 31.0 to 31.9 in adult, unspecified obesity type  Assessment & Plan:  Patient's (Body mass index is 31.35 kg/m².) indicates that they are obese (BMI >30) with health conditions that include hypertension . Weight is stable. BMI  is above average; BMI management plan is completed. We discussed portion control and increasing exercise.       5. Gastroesophageal reflux disease without esophagitis  Assessment & Plan:  Stable on current medication.        6. Other psychotic disorder not due to substance or known physiological condition  Assessment & Plan:  Psychological condition is unchanged.  Patient not on any medication and not seeing a Psychiatrist at this time.  Psychological condition  will be reassessed at the next regular appointment.      7. Vitamin D deficiency  Assessment & Plan:  Stable on current medication.        8. Normocytic anemia  Assessment & Plan:  Stable on Ferrous sulfate. Will repeat CBC next visit.        9. Encounter for screening mammogram for breast cancer  -     Mammo Screening Bilateral With CAD; Future    10. Encounter for screening colonoscopy  -     Ambulatory Referral For Screening Colonoscopy    11. Encounter for immunization  -     Tdap Vaccine Greater Than or Equal To 6yo IM         I spent 30 minutes caring for Keysha on this date of service. This time includes time spent by me in the following activities:preparing for the visit, performing a medically appropriate examination and/or evaluation , counseling and educating the patient/family/caregiver, ordering medications, tests, or procedures and documenting information in the medical record       Follow Up   Return in about 1 month (around 5/18/2023) for Annual physical, Blood work.  Patient was given instructions and counseling regarding her condition or for health maintenance advice. Please see specific information pulled into the AVS if appropriate.     The  patient is advised to continue all of her regular medications as prescribed. She was counseled regarding the importance of diet, exercise and medication compliance.    This document has been electronically signed by Ky Bond MD  April 25, 2023 09:58 EDT

## 2023-04-19 PROBLEM — Z74.09 IMPAIRED MOBILITY: Status: RESOLVED | Noted: 2022-06-07 | Resolved: 2023-04-19

## 2023-04-19 PROBLEM — E66.9 CLASS 1 OBESITY WITH SERIOUS COMORBIDITY AND BODY MASS INDEX (BMI) OF 31.0 TO 31.9 IN ADULT: Status: ACTIVE | Noted: 2023-04-19

## 2023-04-19 PROBLEM — J90 PLEURAL EFFUSION, LEFT: Status: RESOLVED | Noted: 2022-10-18 | Resolved: 2023-04-19

## 2023-04-19 PROBLEM — E66.811 CLASS 1 OBESITY WITH SERIOUS COMORBIDITY AND BODY MASS INDEX (BMI) OF 31.0 TO 31.9 IN ADULT: Status: ACTIVE | Noted: 2023-04-19

## 2023-04-25 PROBLEM — D64.9 NORMOCYTIC ANEMIA: Status: RESOLVED | Noted: 2022-06-04 | Resolved: 2023-04-25

## 2023-04-25 PROBLEM — R53.81 PHYSICAL DEBILITY: Status: RESOLVED | Noted: 2022-10-24 | Resolved: 2023-04-25

## 2023-04-25 PROBLEM — E83.39 HYPERPHOSPHATEMIA: Status: RESOLVED | Noted: 2022-06-09 | Resolved: 2023-04-25

## 2023-04-25 PROBLEM — K86.89 PANCREATIC DUCT DILATED: Status: RESOLVED | Noted: 2022-10-18 | Resolved: 2023-04-25

## 2023-04-25 PROBLEM — E83.41 HYPERMAGNESEMIA: Status: RESOLVED | Noted: 2022-06-14 | Resolved: 2023-04-25

## 2023-04-25 PROBLEM — N28.9 HYPERVOLEMIA ASSOCIATED WITH RENAL INSUFFICIENCY: Status: RESOLVED | Noted: 2022-10-24 | Resolved: 2023-04-25

## 2023-04-25 PROBLEM — R47.01 APHASIA: Status: RESOLVED | Noted: 2022-12-05 | Resolved: 2023-04-25

## 2023-04-25 PROBLEM — R18.8 ASCITES: Status: RESOLVED | Noted: 2022-10-18 | Resolved: 2023-04-25

## 2023-04-25 PROBLEM — E87.70 HYPERVOLEMIA ASSOCIATED WITH RENAL INSUFFICIENCY: Status: RESOLVED | Noted: 2022-10-24 | Resolved: 2023-04-25

## 2023-04-25 PROBLEM — I10 ESSENTIAL HYPERTENSION: Chronic | Status: ACTIVE | Noted: 2022-06-04

## 2023-04-25 PROBLEM — E55.9 VITAMIN D DEFICIENCY: Status: ACTIVE | Noted: 2023-04-25

## 2023-04-25 PROBLEM — E83.51 HYPOCALCEMIA: Status: RESOLVED | Noted: 2022-10-18 | Resolved: 2023-04-25
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
## 2023-04-25 PROBLEM — I63.9 ACUTE ISCHEMIC STROKE: Status: RESOLVED | Noted: 2022-06-08 | Resolved: 2023-04-25

## 2023-04-25 PROBLEM — E87.5 HYPERKALEMIA: Status: RESOLVED | Noted: 2022-06-09 | Resolved: 2023-04-25

## 2023-04-25 PROBLEM — N18.6 ESRD (END STAGE RENAL DISEASE): Chronic | Status: ACTIVE | Noted: 2022-11-05

## 2023-04-25 NOTE — ASSESSMENT & PLAN NOTE
Hypertension is stable.  Continue current treatment regimen.  Dietary sodium restriction.  Weight loss.  Continue current medications.  Blood pressure will be reassessed in 1 month.

## 2023-04-25 NOTE — ASSESSMENT & PLAN NOTE
Patient's (Body mass index is 31.35 kg/m².) indicates that they are obese (BMI >30) with health conditions that include hypertension . Weight is stable. BMI  is above average; BMI management plan is completed. We discussed portion control and increasing exercise.

## 2023-04-25 NOTE — ASSESSMENT & PLAN NOTE
Renal condition is stable.  Continue current treatment regimen.  Continue current medications.  Renal condition will be reassessed in 1 month.

## 2023-04-25 NOTE — ASSESSMENT & PLAN NOTE
Psychological condition is unchanged.  Patient not on any medication and not seeing a Psychiatrist at this time.  Psychological condition  will be reassessed at the next regular appointment.

## 2023-04-26 RX ORDER — CARVEDILOL 25 MG/1
25 TABLET ORAL 2 TIMES DAILY WITH MEALS
Qty: 180 TABLET | Refills: 0 | Status: SHIPPED | OUTPATIENT
Start: 2023-04-26 | End: 2023-07-25

## 2023-05-08 NOTE — TELEPHONE ENCOUNTER
Incoming Refill Request      Medication requested (name and dose): triphro caps vitcmplx/vit c/fa 100/1 mg gelcap    Pharmacy where request should be sent: [facility]    Additional details provided by patient: wants the brand name    Best call back number: [phone]    Does the patient have less than a 3 day supply:  [x] Yes  [] No    Ponce Metz Rep  05/08/23, 10:10 EDT

## 2023-05-11 RX ORDER — B COMPLEX, C NO.20/FOLIC ACID 1 MG
1 CAPSULE ORAL DAILY
Qty: 30 CAPSULE | Refills: 2 | Status: SHIPPED | OUTPATIENT
Start: 2023-05-11

## 2023-05-11 NOTE — TELEPHONE ENCOUNTER
Rx Refill Note  Requested Prescriptions     Pending Prescriptions Disp Refills   • triphrocaps (NEPHROCAPS) 1 MG capsule capsule 30 capsule 2     Sig: Take 1 capsule by mouth Daily.      Last office visit with prescribing clinician: Visit date not found   Last telemedicine visit with prescribing clinician: 4/26/2023   Next office visit with prescribing clinician: Visit date not found                         Would you like a call back once the refill request has been completed: [] Yes [] No    If the office needs to give you a call back, can they leave a voicemail: [] Yes [] No    Crista Jim RN  05/11/23, 11:31 EDT

## 2023-05-17 ENCOUNTER — OFFICE VISIT (OUTPATIENT)
Dept: FAMILY MEDICINE CLINIC | Facility: CLINIC | Age: 58
End: 2023-05-17
Payer: MEDICARE

## 2023-05-17 VITALS
WEIGHT: 178 LBS | RESPIRATION RATE: 18 BRPM | HEART RATE: 92 BPM | DIASTOLIC BLOOD PRESSURE: 78 MMHG | TEMPERATURE: 97.1 F | OXYGEN SATURATION: 93 % | BODY MASS INDEX: 31.54 KG/M2 | HEIGHT: 63 IN | SYSTOLIC BLOOD PRESSURE: 115 MMHG

## 2023-05-17 DIAGNOSIS — Z00.00 ROUTINE GENERAL MEDICAL EXAMINATION AT A HEALTH CARE FACILITY: Primary | ICD-10-CM

## 2023-05-17 DIAGNOSIS — F28 OTHER PSYCHOTIC DISORDER NOT DUE TO SUBSTANCE OR KNOWN PHYSIOLOGICAL CONDITION: ICD-10-CM

## 2023-05-17 DIAGNOSIS — I10 ESSENTIAL HYPERTENSION: Chronic | ICD-10-CM

## 2023-05-17 DIAGNOSIS — N18.6 ESRD (END STAGE RENAL DISEASE): Chronic | ICD-10-CM

## 2023-05-17 DIAGNOSIS — K21.9 GASTROESOPHAGEAL REFLUX DISEASE WITHOUT ESOPHAGITIS: ICD-10-CM

## 2023-05-17 DIAGNOSIS — E78.49 OTHER HYPERLIPIDEMIA: Chronic | ICD-10-CM

## 2023-05-17 LAB
ALBUMIN SERPL-MCNC: 4.4 G/DL (ref 3.5–5.2)
ALBUMIN/GLOB SERPL: 1.3 G/DL
ALP SERPL-CCNC: 275 U/L (ref 39–117)
ALT SERPL W P-5'-P-CCNC: 67 U/L (ref 1–33)
ANION GAP SERPL CALCULATED.3IONS-SCNC: 15.1 MMOL/L (ref 5–15)
AST SERPL-CCNC: 33 U/L (ref 1–32)
BASOPHILS # BLD AUTO: 0.06 10*3/MM3 (ref 0–0.2)
BASOPHILS NFR BLD AUTO: 0.8 % (ref 0–1.5)
BILIRUB BLD-MCNC: NEGATIVE MG/DL
BILIRUB SERPL-MCNC: 0.4 MG/DL (ref 0–1.2)
BUN SERPL-MCNC: 47 MG/DL (ref 6–20)
BUN/CREAT SERPL: 8.6 (ref 7–25)
CALCIUM SPEC-SCNC: 10.5 MG/DL (ref 8.6–10.5)
CHLORIDE SERPL-SCNC: 101 MMOL/L (ref 98–107)
CHOLEST SERPL-MCNC: 221 MG/DL (ref 0–200)
CLARITY, POC: CLEAR
CO2 SERPL-SCNC: 25.9 MMOL/L (ref 22–29)
COLOR UR: YELLOW
CREAT SERPL-MCNC: 5.44 MG/DL (ref 0.57–1)
DEPRECATED RDW RBC AUTO: 49.3 FL (ref 37–54)
EGFRCR SERPLBLD CKD-EPI 2021: 8.5 ML/MIN/1.73
EOSINOPHIL # BLD AUTO: 0.27 10*3/MM3 (ref 0–0.4)
EOSINOPHIL NFR BLD AUTO: 3.6 % (ref 0.3–6.2)
ERYTHROCYTE [DISTWIDTH] IN BLOOD BY AUTOMATED COUNT: 14.5 % (ref 12.3–15.4)
GLOBULIN UR ELPH-MCNC: 3.5 GM/DL
GLUCOSE SERPL-MCNC: 152 MG/DL (ref 65–99)
GLUCOSE UR STRIP-MCNC: NEGATIVE MG/DL
HCT VFR BLD AUTO: 38.4 % (ref 34–46.6)
HCV AB SER DONR QL: NORMAL
HDLC SERPL-MCNC: 112 MG/DL (ref 40–60)
HGB BLD-MCNC: 12.4 G/DL (ref 12–15.9)
IMM GRANULOCYTES # BLD AUTO: 0.02 10*3/MM3 (ref 0–0.05)
IMM GRANULOCYTES NFR BLD AUTO: 0.3 % (ref 0–0.5)
KETONES UR QL: NEGATIVE
LDLC SERPL CALC-MCNC: 96 MG/DL (ref 0–100)
LDLC/HDLC SERPL: 0.83 {RATIO}
LEUKOCYTE EST, POC: NEGATIVE
LYMPHOCYTES # BLD AUTO: 2.1 10*3/MM3 (ref 0.7–3.1)
LYMPHOCYTES NFR BLD AUTO: 28.1 % (ref 19.6–45.3)
MCH RBC QN AUTO: 30.1 PG (ref 26.6–33)
MCHC RBC AUTO-ENTMCNC: 32.3 G/DL (ref 31.5–35.7)
MCV RBC AUTO: 93.2 FL (ref 79–97)
MONOCYTES # BLD AUTO: 0.56 10*3/MM3 (ref 0.1–0.9)
MONOCYTES NFR BLD AUTO: 7.5 % (ref 5–12)
NEUTROPHILS NFR BLD AUTO: 4.47 10*3/MM3 (ref 1.7–7)
NEUTROPHILS NFR BLD AUTO: 59.7 % (ref 42.7–76)
NITRITE UR-MCNC: NEGATIVE MG/ML
NRBC BLD AUTO-RTO: 0 /100 WBC (ref 0–0.2)
PH UR: 7.5 [PH] (ref 5–8)
PLATELET # BLD AUTO: 218 10*3/MM3 (ref 140–450)
PMV BLD AUTO: 10.3 FL (ref 6–12)
POTASSIUM SERPL-SCNC: 5.1 MMOL/L (ref 3.5–5.2)
PROT SERPL-MCNC: 7.9 G/DL (ref 6–8.5)
PROT UR STRIP-MCNC: ABNORMAL MG/DL
RBC # BLD AUTO: 4.12 10*6/MM3 (ref 3.77–5.28)
RBC # UR STRIP: NEGATIVE /UL
SODIUM SERPL-SCNC: 142 MMOL/L (ref 136–145)
SP GR UR: 1.01 (ref 1–1.03)
TRIGL SERPL-MCNC: 78 MG/DL (ref 0–150)
TSH SERPL DL<=0.05 MIU/L-ACNC: 1.95 UIU/ML (ref 0.27–4.2)
UROBILINOGEN UR QL: NORMAL
VLDLC SERPL-MCNC: 13 MG/DL (ref 5–40)
WBC NRBC COR # BLD: 7.48 10*3/MM3 (ref 3.4–10.8)

## 2023-05-17 PROCEDURE — 85025 COMPLETE CBC W/AUTO DIFF WBC: CPT | Performed by: FAMILY MEDICINE

## 2023-05-17 PROCEDURE — 80053 COMPREHEN METABOLIC PANEL: CPT | Performed by: FAMILY MEDICINE

## 2023-05-17 PROCEDURE — 80061 LIPID PANEL: CPT | Performed by: FAMILY MEDICINE

## 2023-05-17 PROCEDURE — 84443 ASSAY THYROID STIM HORMONE: CPT | Performed by: FAMILY MEDICINE

## 2023-05-17 PROCEDURE — 86803 HEPATITIS C AB TEST: CPT | Performed by: FAMILY MEDICINE

## 2023-05-17 RX ORDER — ATORVASTATIN CALCIUM 40 MG/1
40 TABLET, FILM COATED ORAL NIGHTLY
Qty: 90 TABLET | Refills: 1 | Status: SHIPPED | OUTPATIENT
Start: 2023-05-17 | End: 2023-11-13

## 2023-05-17 NOTE — ASSESSMENT & PLAN NOTE
Lipid abnormalities are unchanged.  Pharmacotherapy as ordered.  Lipids will be reassessed in 6 months.   Her delusions are worse today compared to her last visit.

## 2023-05-17 NOTE — ASSESSMENT & PLAN NOTE
Psychological condition is worsening.  Referral to psychiatry.  Psychological condition  will be reassessed at the next regular appointment.  Spoke with her  and he will try to take her to the Psychiatry appointment.  He states that she always refused to see the Psychiatrist.

## 2023-05-17 NOTE — ASSESSMENT & PLAN NOTE
Hypertension is stable.  Continue current treatment regimen.  Continue current medications.  Blood pressure will be reassessed at the next regular appointment.

## 2023-05-17 NOTE — ASSESSMENT & PLAN NOTE
Renal condition is unchanged.  Continue current treatment regimen.  Continue current medications. The patient is doing dialysis 3 x a week.  Renal condition will be reassessed in 6 months.

## 2023-05-17 NOTE — PROGRESS NOTES
"Chief Complaint  Annual Exam    Subjective        Keysha Lund presents to [facility] PRIMARY CARE  History of Present Illness  Patient is a 57 y.o. female here today for annual physical, preventive labs and also to discuss chronic medical problems including hypertension, psychosis, ESRD on dialysis 3 times a week and GERD. Patient denies no significant issue. Patient complains of no significant issue. Patient is here for monitoring of chronic issues due to need for monitoring of adverse effects and side effects.      Objective   Vital Signs:  /78 (BP Location: Right arm, Patient Position: Sitting, Cuff Size: Adult)   Pulse 92   Temp 97.1 °F (36.2 °C) (Temporal)   Resp 18   Ht 160 cm (63\")   Wt 80.7 kg (178 lb)   SpO2 93%   BMI 31.53 kg/m²   Estimated body mass index is 31.53 kg/m² as calculated from the following:    Height as of this encounter: 160 cm (63\").    Weight as of this encounter: 80.7 kg (178 lb).       BMI is >= 30 and <35. (Class 1 Obesity). The following options were offered after discussion;: exercise counseling/recommendations and nutrition counseling/recommendations      Physical Exam  Vitals and nursing note reviewed.   Constitutional:       Appearance: Normal appearance.   HENT:      Head: Normocephalic and atraumatic.      Right Ear: Tympanic membrane and ear canal normal.      Left Ear: Tympanic membrane and ear canal normal.      Nose: Nose normal.      Mouth/Throat:      Mouth: Mucous membranes are moist.   Eyes:      Extraocular Movements: Extraocular movements intact.      Pupils: Pupils are equal, round, and reactive to light.   Cardiovascular:      Rate and Rhythm: Normal rate and regular rhythm.  No extrasystoles are present.     Heart sounds: Normal heart sounds. No murmur heard.  Pulmonary:      Effort: Pulmonary effort is normal.      Breath sounds: Normal breath sounds. No decreased breath sounds, wheezing or rhonchi.   Abdominal:      Palpations: " Abdomen is soft. There is no mass.      Tenderness: There is no abdominal tenderness. There is no right CVA tenderness, left CVA tenderness, guarding or rebound.   Musculoskeletal:         General: Normal range of motion.      Cervical back: Normal range of motion and neck supple.      Right lower leg: No edema.      Left lower leg: No edema.   Lymphadenopathy:      Head:      Right side of head: No submental, submandibular, preauricular, posterior auricular or occipital adenopathy.      Left side of head: No submental, submandibular, preauricular, posterior auricular or occipital adenopathy.      Cervical: No cervical adenopathy.      Upper Body:      Right upper body: No supraclavicular or axillary adenopathy.      Left upper body: No supraclavicular or axillary adenopathy.   Skin:     Findings: No rash.   Neurological:      General: No focal deficit present.      Mental Status: She is alert and oriented to person, place, and time.      Sensory: Sensation is intact.      Motor: Motor function is intact.      Coordination: Coordination is intact.      Gait: Gait is intact.      Deep Tendon Reflexes: Reflexes are normal and symmetric.   Psychiatric:         Attention and Perception: Attention and perception normal.         Mood and Affect: Mood normal.         Speech: Speech normal.         Behavior: Behavior is cooperative.         Thought Content: Thought content is delusional. Thought content does not include suicidal ideation. Thought content does not include homicidal or suicidal plan.         Cognition and Memory: Cognition normal.         Judgment: Judgment normal.        Result Review :                   Assessment and Plan   Diagnoses and all orders for this visit:    1. Routine general medical examination at a health care facility (Primary)  -     CBC & Differential  -     Comprehensive Metabolic Panel  -     Lipid Panel  -     TSH  -     POC Urinalysis Dipstick  -     Hepatitis C Antibody    2. Essential  hypertension  Assessment & Plan:  Hypertension is stable.  Continue current treatment regimen.  Continue current medications.  Blood pressure will be reassessed at the next regular appointment.      3. ESRD (end stage renal disease)  Assessment & Plan:  Renal condition is unchanged.  Continue current treatment regimen.  Continue current medications. The patient is doing dialysis 3 x a week.  Renal condition will be reassessed in 6 months.      4. Other hyperlipidemia  Assessment & Plan:  Lipid abnormalities are unchanged.  Pharmacotherapy as ordered.  Lipids will be reassessed in 6 months.   Her delusions are worse today compared to her last visit.    Orders:  -     atorvastatin (LIPITOR) 40 MG tablet; Take 1 tablet by mouth Every Night for 180 days.  Dispense: 90 tablet; Refill: 1    5. Other psychotic disorder not due to substance or known physiological condition  Assessment & Plan:  Psychological condition is worsening.  Referral to psychiatry.  Psychological condition  will be reassessed at the next regular appointment.  Spoke with her  and he will try to take her to the Psychiatry appointment.  He states that she always refused to see the Psychiatrist.    Orders:  -     Ambulatory Referral to Psychiatry    6. Gastroesophageal reflux disease without esophagitis  Assessment & Plan:  Stable on current medication.             I spent 30 minutes caring for Keysha on this date of service. This time includes time spent by me in the following activities:preparing for the visit, performing a medically appropriate examination and/or evaluation , counseling and educating the patient/family/caregiver, ordering medications, tests, or procedures and documenting information in the medical record       Follow Up   Return in about 6 months (around 11/17/2023), or if symptoms worsen or fail to improve.  Patient was given instructions and counseling regarding her condition or for health maintenance advice. Please see  specific information pulled into the AVS if appropriate.     The patient is advised to continue all of her regular medications as prescribed. She was counseled regarding the importance of diet, exercise and medication compliance.    The preventive exam has been reviewed in detail.  The patient has been fully counseled on preventative guidelines for vaccines, cancer screenings, and other health maintenance needs.   The patient has been counseled on guidelines for maintaining a lifestyle to promote good health and to minimize chronic diseases.  The patient has been assisted with scheduling these healthcare procedures for the coming year and given a written document of health maintenance and anticipatory guidance for age with the AVS.    This document has been electronically signed by Ky Bond MD  May 18, 2023 14:13 EDT

## 2023-06-02 ENCOUNTER — TELEPHONE (OUTPATIENT)
Dept: FAMILY MEDICINE CLINIC | Facility: CLINIC | Age: 58
End: 2023-06-02

## 2023-06-19 ENCOUNTER — OFFICE VISIT (OUTPATIENT)
Dept: FAMILY MEDICINE CLINIC | Facility: CLINIC | Age: 58
End: 2023-06-19
Payer: COMMERCIAL

## 2023-06-19 VITALS
RESPIRATION RATE: 18 BRPM | HEIGHT: 63 IN | TEMPERATURE: 96.9 F | BODY MASS INDEX: 32.6 KG/M2 | DIASTOLIC BLOOD PRESSURE: 82 MMHG | HEART RATE: 74 BPM | WEIGHT: 184 LBS | SYSTOLIC BLOOD PRESSURE: 134 MMHG | OXYGEN SATURATION: 95 %

## 2023-06-19 DIAGNOSIS — R53.83 OTHER FATIGUE: ICD-10-CM

## 2023-06-19 DIAGNOSIS — S99.921S INJURY OF TOE ON RIGHT FOOT, SEQUELA: ICD-10-CM

## 2023-06-19 DIAGNOSIS — E11.65 TYPE 2 DIABETES MELLITUS WITH HYPERGLYCEMIA, WITHOUT LONG-TERM CURRENT USE OF INSULIN: ICD-10-CM

## 2023-06-19 DIAGNOSIS — Z79.899 MEDICATION MANAGEMENT: ICD-10-CM

## 2023-06-19 DIAGNOSIS — F99 CHRONIC MENTAL ILLNESS: ICD-10-CM

## 2023-06-19 DIAGNOSIS — H54.3 VISION LOSS, BILATERAL: ICD-10-CM

## 2023-06-19 DIAGNOSIS — R73.9 ELEVATED BLOOD SUGAR LEVEL: Primary | ICD-10-CM

## 2023-06-19 LAB
EXPIRATION DATE: NORMAL
HBA1C MFR BLD: 6.8 %
Lab: NORMAL

## 2023-06-19 RX ORDER — CYANOCOBALAMIN 1000 UG/ML
1000 INJECTION, SOLUTION INTRAMUSCULAR; SUBCUTANEOUS
Status: SHIPPED | OUTPATIENT
Start: 2023-06-19

## 2023-06-19 RX ORDER — METFORMIN HYDROCHLORIDE 500 MG/1
500 TABLET, EXTENDED RELEASE ORAL
Qty: 30 TABLET | Refills: 2 | Status: SHIPPED | OUTPATIENT
Start: 2023-06-19 | End: 2023-09-17

## 2023-06-19 RX ADMIN — CYANOCOBALAMIN 1000 MCG: 1000 INJECTION, SOLUTION INTRAMUSCULAR; SUBCUTANEOUS at 11:31
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
## 2023-06-19 NOTE — ASSESSMENT & PLAN NOTE
Diabetes is worsening.   Dietary recommendations for ADA diet.  Regular aerobic exercise.  Discussed foot care.  Reminded to get yearly retinal exam.  Newly diagnosed today   Diabetes will be reassessed in 3 months.

## 2023-08-14 RX ORDER — B COMPLEX, C NO.20/FOLIC ACID 1 MG
1 CAPSULE ORAL DAILY
Qty: 90 CAPSULE | Refills: 0 | Status: SHIPPED | OUTPATIENT
Start: 2023-08-14

## 2023-10-12 DIAGNOSIS — I10 ESSENTIAL HYPERTENSION: ICD-10-CM

## 2023-10-12 RX ORDER — NIFEDIPINE 90 MG/1
90 TABLET, FILM COATED, EXTENDED RELEASE ORAL DAILY
Qty: 90 TABLET | Refills: 0 | Status: SHIPPED | OUTPATIENT
Start: 2023-10-12 | End: 2024-01-10

## 2023-10-23 RX ORDER — CARVEDILOL 25 MG/1
TABLET ORAL
Qty: 180 TABLET | Refills: 0 | Status: SHIPPED | OUTPATIENT
Start: 2023-10-23

## 2023-11-27 DIAGNOSIS — E78.49 OTHER HYPERLIPIDEMIA: Chronic | ICD-10-CM

## 2023-11-27 RX ORDER — ATORVASTATIN CALCIUM 40 MG/1
40 TABLET, FILM COATED ORAL NIGHTLY
Qty: 90 TABLET | Refills: 1 | Status: SHIPPED | OUTPATIENT
Start: 2023-11-27 | End: 2024-05-25

## 2023-12-20 ENCOUNTER — TELEPHONE (OUTPATIENT)
Dept: FAMILY MEDICINE CLINIC | Facility: CLINIC | Age: 58
End: 2023-12-20
Payer: COMMERCIAL

## 2023-12-20 NOTE — TELEPHONE ENCOUNTER
"Relay     \"Our records show you are due for A1c testing. Can we get you scheduled for a follow up to get this done?\"    HUB okay to schedule at patients earliest convenience OV or Lab visit, preferably before end of year.               "

## 2024-01-15 DIAGNOSIS — I10 ESSENTIAL HYPERTENSION: ICD-10-CM

## 2024-01-15 RX ORDER — NIFEDIPINE 90 MG/1
90 TABLET, FILM COATED, EXTENDED RELEASE ORAL DAILY
Qty: 90 TABLET | Refills: 0 | Status: SHIPPED | OUTPATIENT
Start: 2024-01-15 | End: 2024-04-14

## 2024-01-15 NOTE — TELEPHONE ENCOUNTER
Caller: Gregg Lund    Relationship: Emergency Contact    Best call back number: [phone]     Requested Prescriptions:   Requested Prescriptions     Pending Prescriptions Disp Refills    NIFEdipine CC (ADALAT CC) 90 MG 24 hr tablet 90 tablet 0     Sig: Take 1 tablet by mouth Daily for 90 days.        Pharmacy where request should be sent: [facility] [address] [place] [address] [phone] [facility] [phone] FX     Last office visit with prescribing clinician: 5/17/2023   Last telemedicine visit with prescribing clinician: Visit date not found   Next office visit with prescribing clinician: Visit date not found     Additional details provided by patient: TOOK LAST PILL TODAY 01-15-24. USUALLY GETS 90 DAY SUPPLY.    Does the patient have less than a 3 day supply:  [x] Yes  [] No    Would you like a call back once the refill request has been completed: [] Yes [x] No    If the office needs to give you a call back, can they leave a voicemail: [] Yes [x] No    Ponce Stewart Rep   01/15/24 14:33 EST

## 2024-01-22 RX ORDER — CARVEDILOL 25 MG/1
TABLET ORAL
Qty: 180 TABLET | Refills: 0 | Status: SHIPPED | OUTPATIENT
Start: 2024-01-22

## 2024-01-22 NOTE — TELEPHONE ENCOUNTER
Rx Refill Note  Requested Prescriptions     Pending Prescriptions Disp Refills    carvedilol (COREG) 25 MG tablet [Pharmacy Med Name: Carvedilol 25 MG Oral Tablet] 180 tablet 0     Sig: TAKE 1 TABLET BY MOUTH TWICE DAILY WITH MEALS      Last office visit with prescribing clinician: 5/17/2023   Last telemedicine visit with prescribing clinician: Visit date not found   Next office visit with prescribing clinician: Visit date not found                         Would you like a call back once the refill request has been completed: [] Yes [] No    If the office needs to give you a call back, can they leave a voicemail: [] Yes [] No    Crista Jim RN  01/22/24, 09:54 EST

## 2024-04-09 DIAGNOSIS — I10 ESSENTIAL HYPERTENSION: ICD-10-CM

## 2024-04-09 DIAGNOSIS — E78.49 OTHER HYPERLIPIDEMIA: Chronic | ICD-10-CM

## 2024-04-09 RX ORDER — NIFEDIPINE 90 MG/1
90 TABLET, FILM COATED, EXTENDED RELEASE ORAL DAILY
Qty: 90 TABLET | Refills: 0 | Status: SHIPPED | OUTPATIENT
Start: 2024-04-09 | End: 2024-07-08

## 2024-04-09 RX ORDER — ATORVASTATIN CALCIUM 40 MG/1
40 TABLET, FILM COATED ORAL
Qty: 90 TABLET | Refills: 0 | Status: SHIPPED | OUTPATIENT
Start: 2024-04-09

## 2024-04-17 RX ORDER — CARVEDILOL 25 MG/1
TABLET ORAL
Qty: 180 TABLET | Refills: 0 | Status: SHIPPED | OUTPATIENT
Start: 2024-04-17

## 2024-07-07 DIAGNOSIS — I10 ESSENTIAL HYPERTENSION: ICD-10-CM

## 2024-07-08 RX ORDER — NIFEDIPINE 90 MG/1
90 TABLET, FILM COATED, EXTENDED RELEASE ORAL DAILY
Qty: 90 TABLET | Refills: 0 | Status: SHIPPED | OUTPATIENT
Start: 2024-07-08

## 2024-07-08 NOTE — TELEPHONE ENCOUNTER
Rx Refill Note  Requested Prescriptions     Pending Prescriptions Disp Refills    NIFEdipine CC (ADALAT CC) 90 MG 24 hr tablet [Pharmacy Med Name: NIFEdipine ER 90 MG Oral Tablet Extended Release 24 Hour] 90 tablet 0     Sig: Take 1 tablet by mouth once daily      Last office visit with prescribing clinician: 5/17/2023   Last telemedicine visit with prescribing clinician: Visit date not found   Next office visit with prescribing clinician: Visit date not found                         Would you like a call back once the refill request has been completed: [] Yes [] No    If the office needs to give you a call back, can they leave a voicemail: [] Yes [] No    Emma Santos, EDGARDO  07/08/24, 11:29 EDT

## 2024-07-15 ENCOUNTER — TELEPHONE (OUTPATIENT)
Dept: FAMILY MEDICINE CLINIC | Facility: CLINIC | Age: 59
End: 2024-07-15
Payer: COMMERCIAL

## 2024-07-15 RX ORDER — CARVEDILOL 25 MG/1
25 TABLET ORAL 2 TIMES DAILY WITH MEALS
Qty: 60 TABLET | Refills: 0 | Status: SHIPPED | OUTPATIENT
Start: 2024-07-15 | End: 2024-08-14

## 2024-07-15 NOTE — TELEPHONE ENCOUNTER
Rx Refill Note  Requested Prescriptions     Pending Prescriptions Disp Refills    carvedilol (COREG) 25 MG tablet [Pharmacy Med Name: Carvedilol 25 MG Oral Tablet] 180 tablet 0     Sig: TAKE 1 TABLET BY MOUTH TWICE DAILY WITH MEALS      Last office visit with prescribing clinician: 5/17/2023   Last telemedicine visit with prescribing clinician: Visit date not found   Next office visit with prescribing clinician: Visit date not found                         Would you like a call back once the refill request has been completed: [] Yes [] No    If the office needs to give you a call back, can they leave a voicemail: [] Yes [] No    Crista Jim RN  07/15/24, 14:44 EDT

## 2024-07-22 DIAGNOSIS — E78.49 OTHER HYPERLIPIDEMIA: Chronic | ICD-10-CM

## 2024-07-22 RX ORDER — ATORVASTATIN CALCIUM 40 MG/1
40 TABLET, FILM COATED ORAL
Qty: 90 TABLET | Refills: 0 | Status: SHIPPED | OUTPATIENT
Start: 2024-07-22

## 2024-07-22 NOTE — TELEPHONE ENCOUNTER
Rx Refill Note  Requested Prescriptions     Pending Prescriptions Disp Refills    atorvastatin (LIPITOR) 40 MG tablet [Pharmacy Med Name: Atorvastatin Calcium 40 MG Oral Tablet] 90 tablet 0     Sig: TAKE 1 TABLET BY MOUTH ONCE DAILY AT BEDTIME      Last office visit with prescribing clinician: 5/17/2023   Last telemedicine visit with prescribing clinician: Visit date not found   Next office visit with prescribing clinician: Visit date not found                         Would you like a call back once the refill request has been completed: [] Yes [] No    If the office needs to give you a call back, can they leave a voicemail: [] Yes [] No    Emma Santos CMA  07/22/24, 11:15 EDTRx Refill Note  Requested Prescriptions     Pending Prescriptions Disp Refills    atorvastatin (LIPITOR) 40 MG tablet [Pharmacy Med Name: Atorvastatin Calcium 40 MG Oral Tablet] 90 tablet 0     Sig: TAKE 1 TABLET BY MOUTH ONCE DAILY AT BEDTIME      Last office visit with prescribing clinician: 5/17/2023   Last telemedicine visit with prescribing clinician: Visit date not found   Next office visit with prescribing clinician: Visit date not found                         Would you like a call back once the refill request has been completed: [] Yes [] No    If the office needs to give you a call back, can they leave a voicemail: [] Yes [] No    Emma Santos CMA  07/22/24, 11:15 EDT

## 2024-08-06 ENCOUNTER — TELEPHONE (OUTPATIENT)
Dept: FAMILY MEDICINE CLINIC | Facility: CLINIC | Age: 59
End: 2024-08-06

## 2024-08-06 NOTE — TELEPHONE ENCOUNTER
"Relay     \"ATTEMPTED TO CONTACT PT TO RESCHEDULE APPT DUE TO PROVIDER BEING OUT OF OFFICE, BUT NO ANSWER. LEFT  FOR CALL BACK \"                "

## 2024-08-20 ENCOUNTER — OFFICE VISIT (OUTPATIENT)
Dept: FAMILY MEDICINE CLINIC | Facility: CLINIC | Age: 59
End: 2024-08-20
Payer: COMMERCIAL

## 2024-08-20 VITALS
RESPIRATION RATE: 18 BRPM | DIASTOLIC BLOOD PRESSURE: 68 MMHG | WEIGHT: 182.2 LBS | BODY MASS INDEX: 32.28 KG/M2 | TEMPERATURE: 97 F | OXYGEN SATURATION: 98 % | SYSTOLIC BLOOD PRESSURE: 107 MMHG | HEART RATE: 118 BPM | HEIGHT: 63 IN

## 2024-08-20 DIAGNOSIS — Z99.2 STAGE 5 CHRONIC KIDNEY DISEASE ON CHRONIC DIALYSIS: ICD-10-CM

## 2024-08-20 DIAGNOSIS — Z12.31 ENCOUNTER FOR SCREENING MAMMOGRAM FOR BREAST CANCER: ICD-10-CM

## 2024-08-20 DIAGNOSIS — N18.6 STAGE 5 CHRONIC KIDNEY DISEASE ON CHRONIC DIALYSIS: ICD-10-CM

## 2024-08-20 DIAGNOSIS — E11.65 TYPE 2 DIABETES MELLITUS WITH HYPERGLYCEMIA, WITHOUT LONG-TERM CURRENT USE OF INSULIN: ICD-10-CM

## 2024-08-20 DIAGNOSIS — E66.9 CLASS 1 OBESITY WITH SERIOUS COMORBIDITY AND BODY MASS INDEX (BMI) OF 31.0 TO 31.9 IN ADULT, UNSPECIFIED OBESITY TYPE: ICD-10-CM

## 2024-08-20 DIAGNOSIS — E78.49 OTHER HYPERLIPIDEMIA: Chronic | ICD-10-CM

## 2024-08-20 DIAGNOSIS — I10 ESSENTIAL HYPERTENSION: ICD-10-CM

## 2024-08-20 DIAGNOSIS — U07.1 COVID-19 VIRUS INFECTION: Primary | ICD-10-CM

## 2024-08-20 DIAGNOSIS — Z12.11 ENCOUNTER FOR SCREENING COLONOSCOPY: ICD-10-CM

## 2024-08-20 LAB
EXPIRATION DATE: ABNORMAL
HBA1C MFR BLD: 5.9 % (ref 4.5–5.7)
Lab: ABNORMAL

## 2024-08-20 RX ORDER — CARVEDILOL 25 MG/1
25 TABLET ORAL 2 TIMES DAILY WITH MEALS
Qty: 180 TABLET | Refills: 1 | Status: SHIPPED | OUTPATIENT
Start: 2024-08-20 | End: 2024-11-18

## 2024-08-20 RX ORDER — DEXAMETHASONE SODIUM PHOSPHATE 4 MG/ML
4 INJECTION, SOLUTION INTRA-ARTICULAR; INTRALESIONAL; INTRAMUSCULAR; INTRAVENOUS; SOFT TISSUE ONCE
Status: COMPLETED | OUTPATIENT
Start: 2024-08-20 | End: 2024-08-20

## 2024-08-20 RX ORDER — GUAIFENESIN/DEXTROMETHORPHAN 100-10MG/5
10 SYRUP ORAL 3 TIMES DAILY PRN
Qty: 300 ML | Refills: 0 | Status: SHIPPED | OUTPATIENT
Start: 2024-08-20 | End: 2024-08-30

## 2024-08-20 RX ADMIN — DEXAMETHASONE SODIUM PHOSPHATE 4 MG: 4 INJECTION, SOLUTION INTRA-ARTICULAR; INTRALESIONAL; INTRAMUSCULAR; INTRAVENOUS; SOFT TISSUE at 15:35

## 2024-08-20 NOTE — ASSESSMENT & PLAN NOTE
Diabetes is stable.   Continue current treatment regimen.  Recommended an ADA diet.  Patient not taking any medications  Diabetes will be reassessed in 3 months

## 2024-08-20 NOTE — PROGRESS NOTES
"Chief Complaint  Med Refill (Follow up from covid)    Subjective        Keysha Lund presents to [facility]  History of Present Illness  The patient is a 58 y.o. female who is here today because of persistent congestion and cough. She has Covid and diagnosed last week. She denies any fever, body aches, shortness of breath, headaches or dizziness. She is also here for follow up on her diabetes, hypertension, hyperlipidemia, CKD, obesity abd GERD.       Objective   Vital Signs:  /68 (BP Location: Left arm, Patient Position: Sitting, Cuff Size: Adult)   Pulse 118   Temp 97 °F (36.1 °C) (Temporal)   Resp 18   Ht 160 cm (63\")   Wt 82.6 kg (182 lb 3.2 oz)   SpO2 98%   BMI 32.28 kg/m²   Estimated body mass index is 32.28 kg/m² as calculated from the following:    Height as of this encounter: 160 cm (63\").    Weight as of this encounter: 82.6 kg (182 lb 3.2 oz).    BMI is >= 30 and <35. (Class 1 Obesity). The following options were offered after discussion;: exercise counseling/recommendations and nutrition counseling/recommendations      Physical Exam  Vitals and nursing note reviewed.   Constitutional:       Appearance: Normal appearance. She is obese.   HENT:      Head: Normocephalic and atraumatic.      Right Ear: Tympanic membrane and ear canal normal.      Left Ear: Tympanic membrane and ear canal normal.      Nose: Nose normal.      Mouth/Throat:      Mouth: Mucous membranes are moist.   Eyes:      Extraocular Movements: Extraocular movements intact.      Pupils: Pupils are equal, round, and reactive to light.   Cardiovascular:      Rate and Rhythm: Normal rate and regular rhythm. No extrasystoles are present.     Heart sounds: Normal heart sounds. No murmur heard.  Pulmonary:      Effort: Pulmonary effort is normal.      Breath sounds: Normal breath sounds. No decreased breath sounds, wheezing or rhonchi.   Abdominal:      General: Abdomen is protuberant.      " Herb Farah called and stated pt is supposed to do a barium enema and golytely before a procedure, states golytely wasn't sent in. Writer did not see anything in note, or a scheduled procedure. Please advise. Palpations: Abdomen is soft. There is no mass.      Tenderness: There is no abdominal tenderness. There is no right CVA tenderness, left CVA tenderness, guarding or rebound.   Musculoskeletal:         General: Normal range of motion.      Cervical back: Normal range of motion and neck supple.      Right lower leg: No edema.      Left lower leg: No edema.   Skin:     Findings: No rash.   Neurological:      General: No focal deficit present.      Mental Status: She is alert and oriented to person, place, and time.      Sensory: Sensation is intact.      Motor: Motor function is intact.      Coordination: Coordination is intact.      Gait: Gait is intact.      Deep Tendon Reflexes: Reflexes are normal and symmetric.   Psychiatric:         Attention and Perception: Attention and perception normal.         Mood and Affect: Mood normal.         Speech: Speech normal.         Behavior: Behavior normal. Behavior is cooperative.         Thought Content: Thought content normal.        Result Review :                Assessment and Plan   Diagnoses and all orders for this visit:    1. COVID-19 virus infection (Primary)  -     dexAMETHasone (DECADRON) injection 4 mg  -     guaiFENesin-dextromethorphan (ROBITUSSIN DM) 100-10 MG/5ML syrup; Take 10 mL by mouth 3 (Three) Times a Day As Needed for Cough or Congestion for up to 10 days.  Dispense: 300 mL; Refill: 0    2. Type 2 diabetes mellitus with hyperglycemia, without long-term current use of insulin  Assessment & Plan:  Diabetes is stable.   Continue current treatment regimen.  Recommended an ADA diet.  Patient not taking any medications  Diabetes will be reassessed in 3 months    Orders:  -     POC Glycosylated Hemoglobin (Hb A1C)    3. Essential hypertension  Assessment & Plan:  Hypertension is stable and controlled  Continue current treatment regimen.  Dietary sodium restriction.  Weight loss.  Blood pressure will be reassessed in 1 month.    Orders:  -     carvedilol (COREG)  25 MG tablet; Take 1 tablet by mouth 2 (Two) Times a Day With Meals for 90 days.  Dispense: 180 tablet; Refill: 1    4. Other hyperlipidemia  Assessment & Plan:   Lipid abnormalities are stable    Plan:  Continue same medication/s without change.      Discussed medication dosage, use, side effects, and goals of treatment in detail.    Counseled patient on lifestyle modifications to help control hyperlipidemia.     Patient Treatment Goals:   LDL goal is less than 70    Followup in 4 weeks.      5. Stage 5 chronic kidney disease on chronic dialysis  Assessment & Plan:  Patient still getting dialysis 3x a week.      6. Class 1 obesity with serious comorbidity and body mass index (BMI) of 31.0 to 31.9 in adult, unspecified obesity type  Assessment & Plan:  Patient's (Body mass index is 32.28 kg/m².) indicates that they are obese (BMI >30) with health conditions that include hypertension, diabetes mellitus, dyslipidemias, and GERD . Weight is unchanged. BMI  is above average; BMI management plan is completed. We discussed portion control and increasing exercise.       7. Encounter for screening mammogram for breast cancer  -     Mammo Screening Bilateral With CAD; Future    8. Encounter for screening colonoscopy  -     Cologuard - Stool, Per Rectum; Future           I spent 30 minutes caring for Keysha on this date of service. This time includes time spent by me in the following activities:preparing for the visit, performing a medically appropriate examination and/or evaluation , counseling and educating the patient/family/caregiver, ordering medications, tests, or procedures, and documenting information in the medical record  Follow Up   Return in about 4 weeks (around 9/17/2024) for Annual physical, Blood work.  Patient was given instructions and counseling regarding her condition or for health maintenance advice. Please see specific information pulled into the AVS if appropriate.     The patient is advised to  continue all of her regular medications as prescribed. She was counseled regarding the importance of diet, exercise and medication compliance.      This document has been electronically signed by Ky Bond MD  August 25, 2024 10:15 EDT

## 2024-08-22 ENCOUNTER — TELEPHONE (OUTPATIENT)
Dept: FAMILY MEDICINE CLINIC | Facility: CLINIC | Age: 59
End: 2024-08-22
Payer: COMMERCIAL

## 2024-08-22 NOTE — TELEPHONE ENCOUNTER
Relay     Patient needs to call [employer] to update her address. [employer] is the vendor for the cologuard test ordered by Dr. Bond on 8/21/24. She can reach them at [phone]. We need to verify her address for our office as well.

## 2024-08-25 NOTE — ASSESSMENT & PLAN NOTE
Patient's (Body mass index is 32.28 kg/m².) indicates that they are obese (BMI >30) with health conditions that include hypertension, diabetes mellitus, dyslipidemias, and GERD . Weight is unchanged. BMI  is above average; BMI management plan is completed. We discussed portion control and increasing exercise.

## 2024-08-25 NOTE — ASSESSMENT & PLAN NOTE
Lipid abnormalities are stable    Plan:  Continue same medication/s without change.      Discussed medication dosage, use, side effects, and goals of treatment in detail.    Counseled patient on lifestyle modifications to help control hyperlipidemia.     Patient Treatment Goals:   LDL goal is less than 70    Followup in 4 weeks.

## 2024-09-12 ENCOUNTER — PATIENT OUTREACH (OUTPATIENT)
Dept: FAMILY MEDICINE CLINIC | Facility: CLINIC | Age: 59
End: 2024-09-12
Payer: COMMERCIAL

## 2024-09-12 ENCOUNTER — TELEPHONE (OUTPATIENT)
Dept: FAMILY MEDICINE CLINIC | Facility: CLINIC | Age: 59
End: 2024-09-12
Payer: COMMERCIAL

## 2024-10-04 DIAGNOSIS — I10 ESSENTIAL HYPERTENSION: ICD-10-CM

## 2024-10-04 RX ORDER — NIFEDIPINE 90 MG/1
90 TABLET, FILM COATED, EXTENDED RELEASE ORAL DAILY
Qty: 90 TABLET | Refills: 3 | Status: SHIPPED | OUTPATIENT
Start: 2024-10-04 | End: 2025-01-02

## 2024-10-04 NOTE — TELEPHONE ENCOUNTER
Rx Refill Note  Requested Prescriptions     Pending Prescriptions Disp Refills    NIFEdipine CC (ADALAT CC) 90 MG 24 hr tablet [Pharmacy Med Name: NIFEdipine ER 90 MG Oral Tablet Extended Release 24 Hour] 90 tablet 0     Sig: Take 1 tablet by mouth once daily      Last office visit with prescribing clinician: 8/20/2024   Last telemedicine visit with prescribing clinician: Visit date not found   Next office visit with prescribing clinician: Visit date not found                         Would you like a call back once the refill request has been completed: [] Yes [] No    If the office needs to give you a call back, can they leave a voicemail: [] Yes [] No    Jamilah Garcia MA  10/04/24, 13:25 EDT

## 2024-10-16 DIAGNOSIS — E78.49 OTHER HYPERLIPIDEMIA: Chronic | ICD-10-CM

## 2024-10-16 RX ORDER — ATORVASTATIN CALCIUM 40 MG/1
40 TABLET, FILM COATED ORAL
Qty: 90 TABLET | Refills: 3 | Status: SHIPPED | OUTPATIENT
Start: 2024-10-16 | End: 2025-01-14

## 2024-10-16 NOTE — TELEPHONE ENCOUNTER
Rx Refill Note  Requested Prescriptions     Pending Prescriptions Disp Refills    atorvastatin (LIPITOR) 40 MG tablet [Pharmacy Med Name: Atorvastatin Calcium 40 MG Oral Tablet] 90 tablet 0     Sig: TAKE 1 TABLET BY MOUTH ONCE DAILY AT BEDTIME      Last office visit with prescribing clinician: 8/20/2024   Last telemedicine visit with prescribing clinician: Visit date not found   Next office visit with prescribing clinician: Visit date not found                         Would you like a call back once the refill request has been completed: [] Yes [] No    If the office needs to give you a call back, can they leave a voicemail: [] Yes [] No    Jamilah Garcia MA  10/16/24, 11:46 EDT

## 2024-12-10 ENCOUNTER — TELEPHONE (OUTPATIENT)
Dept: FAMILY MEDICINE CLINIC | Age: 59
End: 2024-12-10
Payer: COMMERCIAL

## 2024-12-10 ENCOUNTER — PATIENT OUTREACH (OUTPATIENT)
Dept: FAMILY MEDICINE CLINIC | Age: 59
End: 2024-12-10
Payer: COMMERCIAL

## 2024-12-10 NOTE — TELEPHONE ENCOUNTER
I tried to reach the patient to follow up on her open mammogram order. I was not able to reach her at the contact number listed. Voicemail left on 12/10/24 at 2:08 pm.

## 2024-12-31 ENCOUNTER — PATIENT OUTREACH (OUTPATIENT)
Dept: FAMILY MEDICINE CLINIC | Age: 59
End: 2024-12-31
Payer: COMMERCIAL